# Patient Record
Sex: FEMALE | Race: WHITE | NOT HISPANIC OR LATINO | URBAN - METROPOLITAN AREA
[De-identification: names, ages, dates, MRNs, and addresses within clinical notes are randomized per-mention and may not be internally consistent; named-entity substitution may affect disease eponyms.]

---

## 2017-02-23 ENCOUNTER — IMPORTED ENCOUNTER (OUTPATIENT)
Dept: URBAN - METROPOLITAN AREA CLINIC 1 | Facility: CLINIC | Age: 72
End: 2017-02-23

## 2017-08-30 ENCOUNTER — HOSPITAL ENCOUNTER (OUTPATIENT)
Dept: MAMMOGRAPHY | Age: 72
Discharge: HOME | End: 2017-08-30
Attending: INTERNAL MEDICINE
Payer: MEDICARE

## 2017-08-30 DIAGNOSIS — Z12.31 VISIT FOR SCREENING MAMMOGRAM: ICD-10-CM

## 2017-08-30 PROCEDURE — 77063 BREAST TOMOSYNTHESIS BI: CPT

## 2017-12-08 ENCOUNTER — HOSPITAL ENCOUNTER (OUTPATIENT)
Dept: GENERAL RADIOLOGY | Age: 72
Discharge: HOME | End: 2017-12-08
Attending: INTERNAL MEDICINE
Payer: MEDICARE

## 2017-12-08 DIAGNOSIS — M81.0 OSTEOPOROSIS: ICD-10-CM

## 2017-12-08 PROCEDURE — 77080 DXA BONE DENSITY AXIAL: CPT

## 2018-03-20 ENCOUNTER — IMPORTED ENCOUNTER (OUTPATIENT)
Dept: URBAN - METROPOLITAN AREA CLINIC 1 | Facility: CLINIC | Age: 73
End: 2018-03-20

## 2018-03-20 PROBLEM — H04.123: Noted: 2018-03-20

## 2018-03-20 PROBLEM — H26.493: Noted: 2018-03-20

## 2018-03-20 PROBLEM — H40.023: Noted: 2018-03-20

## 2018-03-20 PROBLEM — H43.811: Noted: 2018-03-20

## 2018-03-20 PROBLEM — Z96.1: Noted: 2018-03-20

## 2018-03-20 PROCEDURE — 92133 CPTRZD OPH DX IMG PST SGM ON: CPT

## 2018-03-20 PROCEDURE — 92014 COMPRE OPH EXAM EST PT 1/>: CPT

## 2018-03-20 NOTE — PATIENT DISCUSSION
1.  Glaucoma Suspect OU : (.6 OD/ .7 OS)(FHX/ son)(steroid responder) OCT with slight progression OS and stable OD. Will continue to observe and treat with any future progression. Patient is considered high risk. Condition was discussed with patient and patient understands. Will continue to monitor patient for any progression in condition. Patient was advised to call us with any problems questions or concerns. 2.  Dry Eyes OU -The continuation of artificial tears were recommended. 3.  PVD w/o Tear OD - RD precautions. 4.  Pseudophakia OU - Lensx OU5. PCO OU: (Posterior Capsule Opacification)   Observe and consider yag cap when pt feels pco visually significant and visual acuity decreases to appropriate level. 6. Return for an appointment for 6mo/10 HVF with Dr. Adry Malik.

## 2018-09-24 ENCOUNTER — IMPORTED ENCOUNTER (OUTPATIENT)
Dept: URBAN - METROPOLITAN AREA CLINIC 1 | Facility: CLINIC | Age: 73
End: 2018-09-24

## 2018-09-24 PROBLEM — Z96.1: Noted: 2018-09-24

## 2018-09-24 PROBLEM — H04.123: Noted: 2018-09-24

## 2018-09-24 PROBLEM — H26.493: Noted: 2018-09-24

## 2018-09-24 PROBLEM — H40.023: Noted: 2018-09-24

## 2018-09-24 PROBLEM — H43.811: Noted: 2018-09-24

## 2018-09-24 PROCEDURE — 92083 EXTENDED VISUAL FIELD XM: CPT

## 2018-09-24 PROCEDURE — 92015 DETERMINE REFRACTIVE STATE: CPT

## 2018-09-24 PROCEDURE — 92012 INTRM OPH EXAM EST PATIENT: CPT

## 2018-09-24 NOTE — PATIENT DISCUSSION
1.  Glaucoma Suspect OU (CD 0.60/0.70): (FHX/ son)(steroid responder). HVF WNL OU. Patient is considered high risk. Condition was discussed with patient and patient understands. Will continue to monitor patient for any progression in condition. Patient was advised to call us with any problems questions or concerns. 2.  Dry Eyes OU - Recommend cont ATs QID OU routinely 3. PCO OU-Visually Significant secondary to glare and yag cap recommended. Risks and benefits discussed with pt and pt desires to schedule yag cap OD then OS. 4. Pseudophakia OU - Lensx OU5. PVD w/o Tear OD - RD precautions. Return for an appointment in YAG Cap OD then OS with Dr. Eugenio Whiting.

## 2018-09-24 NOTE — PATIENT DISCUSSION
PCO OU-Visually Significant and yag cap recommended. Risks and benefits discussed with pt and pt desires to schedule yag cap.

## 2018-10-19 ENCOUNTER — IMPORTED ENCOUNTER (OUTPATIENT)
Dept: URBAN - METROPOLITAN AREA CLINIC 1 | Facility: CLINIC | Age: 73
End: 2018-10-19

## 2018-10-19 PROBLEM — H26.491: Noted: 2018-10-19

## 2018-10-19 PROCEDURE — 66821 AFTER CATARACT LASER SURGERY: CPT

## 2018-10-19 NOTE — PATIENT DISCUSSION
YAG CAP OD: (Consent signed and scanned into attachments) 1 gtt Prolensa applied. The purpose and nature of the procedure possible alternative methods of treatment the risks involved and the possibility of complications were discussed with patient. The Patient wishes to proceed and the consent was signed. The laser was then performed under topical anesthesia with no complications. Post op instructions were given to patient as well as a follow-up appointment. Patient was advised to call our office if any questions or concerns. Return as scheduled for 2nd eye Yag CAp with Dr. Maryana Arreaga.

## 2018-10-25 ENCOUNTER — OFFICE VISIT (OUTPATIENT)
Dept: FAMILY MEDICINE CLINIC | Age: 73
End: 2018-10-25

## 2018-10-25 VITALS
TEMPERATURE: 96.7 F | OXYGEN SATURATION: 98 % | HEART RATE: 73 BPM | BODY MASS INDEX: 26.7 KG/M2 | HEIGHT: 55 IN | RESPIRATION RATE: 17 BRPM | SYSTOLIC BLOOD PRESSURE: 114 MMHG | DIASTOLIC BLOOD PRESSURE: 60 MMHG | WEIGHT: 115.4 LBS

## 2018-10-25 DIAGNOSIS — Z12.39 SCREENING FOR MALIGNANT NEOPLASM OF BREAST: ICD-10-CM

## 2018-10-25 DIAGNOSIS — Z80.3 FAMILY HISTORY OF BREAST CANCER: ICD-10-CM

## 2018-10-25 DIAGNOSIS — M85.80 OSTEOPENIA, UNSPECIFIED LOCATION: Primary | ICD-10-CM

## 2018-10-25 RX ORDER — CALCITONIN SALMON 200 [IU]/.09ML
1 SPRAY, METERED NASAL DAILY
Qty: 1 BOTTLE | Refills: 0 | Status: SHIPPED | OUTPATIENT
Start: 2018-10-25 | End: 2019-03-21 | Stop reason: ALTCHOICE

## 2018-10-25 RX ORDER — MULTIVITAMIN
1 TABLET ORAL 2 TIMES DAILY
Qty: 180 TAB | Refills: 4 | Status: SHIPPED | OUTPATIENT
Start: 2018-10-25

## 2018-10-25 NOTE — PROGRESS NOTES
Ewa Riley is a 71 y.o.  female and presents with Chief Complaint Patient presents with  
 Osteopenia Subjective: 
Mrs. Derrick North presents to establish care. She has h/o osteopenia; she has family history of breast cancer in her mother at the age of 61. She denies breast lump or skin changes. There is no problem list on file for this patient. There are no active problems to display for this patient. No Known Allergies Past Medical History:  
Diagnosis Date  Menopause History reviewed. No pertinent surgical history. Family History Problem Relation Age of Onset  Breast Cancer Mother Social History Tobacco Use  Smoking status: Never Smoker  Smokeless tobacco: Never Used Substance Use Topics  Alcohol use: Yes ROS General ROS: negative for - chills, fatigue or fever Psychological ROS: negative for - anxiety or depression Ophthalmic ROS: negative for - blurry vision Endocrine ROS: negative for - polydipsia/polyuria or temperature intolerance Respiratory ROS: no cough, shortness of breath, or wheezing Cardiovascular ROS: no chest pain or dyspnea on exertion Gastrointestinal ROS: no abdominal pain, change in bowel habits, or black or bloody stools Genito-Urinary ROS: no dysuria, trouble voiding, or hematuria Musculoskeletal ROS: negative for - joint pain or muscle pain Neurological ROS: no TIA or stroke symptoms Dermatological ROS: negative for - rash or skin lesion changes All other systems reviewed and are negative. Objective: 
Vitals:  
 10/25/18 1358 BP: 114/60 Pulse: 73 Resp: 17 Temp: 96.7 °F (35.9 °C) TempSrc: Oral  
SpO2: 98% Weight: 115 lb 6.4 oz (52.3 kg) Height: 4' 4.5\" (1.334 m) PainSc:   0 - No pain  
 
 
alert, well appearing, and in no distress, oriented to person, place, and time and normal appearing weight Mental status - normal mood, behavior, speech, dress, motor activity, and thought processes Chest - clear to auscultation, no wheezes, rales or rhonchi, symmetric air entry Heart - normal rate, regular rhythm, normal S1, S2, no murmurs, rubs, clicks or gallops Neurological - wide based gait Assessment/Plan: 1. Osteopenia, unspecified location Start intranasal calcitonin after discussion of treatment options 
- calcitonin, salmon, (MIACALCIN) nasal; 1 [place] by IntraNASal route daily. Dispense: 1 Bottle; Refill: 0 
 
2. Family history of breast cancer Imaging ordered - [facility] MAMMO BI SCREENING INCL CAD; Future 3. Screening for malignant neoplasm of breast 
 
- [facility] MAMMO BI SCREENING INCL CAD; Future Lab review: no lab studies available for review at time of visit I have discussed the diagnosis with the patient and the intended plan as seen in the above orders. The patient has received an after-visit summary and questions were answered concerning future plans. I have discussed medication side effects and warnings with the patient as well. I have reviewed the plan of care with the patient, accepted their input and they are in agreement with the treatment goals. Follow-up Disposition: 
Return in about 1 month (around 11/25/2018) for medication evaluation.

## 2018-10-25 NOTE — PATIENT INSTRUCTIONS
Learning About Osteopenia What is osteopenia? Osteopenia is a decrease in thickness, or density, in bones. That means the bones become thinner and weaker. It is much more common in women than in men. It is an early form of osteoporosis, a condition in which the bones are so thin and weak that they can break easily. It's important to know that osteopenia is not a disease. It can happen normally with aging. Having osteopenia means that there is a greater risk that you may get osteoporosis. It also means that you are more likely to break a bone than someone who does not have osteopenia. But not everyone with osteopenia gets osteoporosis or breaks a bone. Osteopenia doesn't cause any symptoms. It's usually found with a type of X-ray called a bone density test. Osteopenia means that your bone density result (T-score) is between 1.0 and 2.5. What increases the risk for osteopenia? Things that increase your risk include: 
· Having a family history of osteoporosis. · Being thin. · Being white or . · Getting too little physical activity. · Smoking. · Drinking too much alcohol often. · Using certain medicines such as steroids. How can you prevent osteoporosis? There are things you can do to slow down osteopenia and prevent osteoporosis. Certain lifestyle changes will help slow the loss of bone density. · Eat food that has plenty of calcium and vitamin D. Yogurt, cheese, milk, and dark green vegetables are high in calcium. Eggs, fatty fish, cereal, and fortified milk are high in vitamin D. 
· Talk to your doctor about taking a calcium supplement that has vitamin D in it. · Get regular exercise. ? Do 30 minutes of weight-bearing exercise on most days of the week. Walking, jogging, stair climbing, and dancing are good choices. ? Do resistance exercises with weights or elastic bands 2 or 3 days a week. · Limit alcohol to 2 drinks a day for men and 1 drink a day for women.  Too much alcohol can cause health problems. · Do not smoke. Smoking can make bones thin faster. If you need help quitting, talk to your doctor about stop-smoking programs and medicines. These can increase your chances of quitting for good. Prescription medicines are available for treating bone thinning. But these are more often used to treat osteoporosis. Follow-up care is a key part of your treatment and safety. Be sure to make and go to all appointments, and call your doctor if you are having problems. It's also a good idea to know your test results and keep a list of the medicines you take. Where can you learn more? Go to http://joseph-tye.info/. Enter P671 in the search box to learn more about \"Learning About Osteopenia. \" Current as of: March 16, 2018 Content Version: 11.8 © 7610-7118 [employer]. Care instructions adapted under license by [employer] (which disclaims liability or warranty for this information). If you have questions about a medical condition or this instruction, always ask your healthcare professional. [place] [address] any warranty or liability for your use of this information.

## 2018-10-25 NOTE — PROGRESS NOTES
Bright Beck is a 71 y.o female that is present in the office for a new patient appointment. Patient refused flu vaccine until later in year. Patient received pneumococcal 23 and Prevnar 13.  
 
1. Have you been to the ER, urgent care clinic since your last visit? Hospitalized since your last visit? No 
 
2. Have you seen or consulted any other health care providers outside of the [address] [place] [address] since your last visit? Include any pap smears or colon screening. No  
 
Health Maintenance Due Topic Date Due  
 Hepatitis C Screening  1945  
 DTaP/Tdap/Td series (1 - Tdap) 12/11/1967  Shingrix Vaccine Age 50> (1 of 2) 12/11/1996  
 FOBT Q 1 YEAR AGE 50-75  12/11/1996  GLAUCOMA SCREENING Q2Y  12/11/2011  Pneumococcal 65+ Low/Medium Risk (1 of 2 - PCV13) 12/11/2011  MEDICARE YEARLY EXAM  03/15/2018  Influenza Age 5 to Adult  08/01/2018

## 2018-10-26 ENCOUNTER — HOSPITAL ENCOUNTER (OUTPATIENT)
Dept: MAMMOGRAPHY | Age: 73
Discharge: HOME | End: 2018-10-26
Attending: FAMILY MEDICINE
Payer: MEDICARE

## 2018-10-26 DIAGNOSIS — Z12.31 VISIT FOR SCREENING MAMMOGRAM: ICD-10-CM

## 2018-10-26 PROCEDURE — 77063 BREAST TOMOSYNTHESIS BI: CPT

## 2018-11-02 ENCOUNTER — IMPORTED ENCOUNTER (OUTPATIENT)
Dept: URBAN - METROPOLITAN AREA CLINIC 1 | Facility: CLINIC | Age: 73
End: 2018-11-02

## 2018-11-02 PROBLEM — H26.492: Noted: 2018-11-02

## 2018-11-02 PROCEDURE — 66821 AFTER CATARACT LASER SURGERY: CPT

## 2018-11-02 NOTE — PATIENT DISCUSSION
YAG CAP OS: (Consent signed and scanned into attachments) 1 gtt Prolensa applied. The purpose and nature of the procedure possible alternative methods of treatment the risks involved and the possibility of complications were discussed with patient. The Patient wishes to proceed and the consent was signed. The laser was then performed under topical anesthesia with no complications. Post op instructions were given to patient as well as a follow-up appointment. Patient was advised to call our office if any questions or concerns.  RTC: 1-6 week YAG PO.

## 2018-12-04 ENCOUNTER — IMPORTED ENCOUNTER (OUTPATIENT)
Dept: URBAN - METROPOLITAN AREA CLINIC 1 | Facility: CLINIC | Age: 73
End: 2018-12-04

## 2018-12-04 PROCEDURE — 99024 POSTOP FOLLOW-UP VISIT: CPT

## 2018-12-04 NOTE — PATIENT DISCUSSION
PO YAG Cap OU: good result. MRX given. Return for an appointment for 30/OCT in 6 months with Dr. Caitlin Gan.

## 2019-03-21 ENCOUNTER — OFFICE VISIT (OUTPATIENT)
Dept: FAMILY MEDICINE CLINIC | Age: 74
End: 2019-03-21

## 2019-03-21 VITALS
WEIGHT: 117.2 LBS | HEIGHT: 55 IN | RESPIRATION RATE: 16 BRPM | BODY MASS INDEX: 27.12 KG/M2 | HEART RATE: 75 BPM | OXYGEN SATURATION: 98 % | SYSTOLIC BLOOD PRESSURE: 122 MMHG | DIASTOLIC BLOOD PRESSURE: 54 MMHG | TEMPERATURE: 97.5 F

## 2019-03-21 DIAGNOSIS — Z80.0 FAMILY HISTORY OF COLON CANCER IN FATHER: ICD-10-CM

## 2019-03-21 DIAGNOSIS — Z23 NEED FOR SHINGLES VACCINE: ICD-10-CM

## 2019-03-21 DIAGNOSIS — Z80.3 FAMILY HISTORY OF BREAST CANCER: ICD-10-CM

## 2019-03-21 DIAGNOSIS — Z00.00 MEDICARE ANNUAL WELLNESS VISIT, INITIAL: Primary | ICD-10-CM

## 2019-03-21 DIAGNOSIS — Z71.89 ADVANCE CARE PLANNING: ICD-10-CM

## 2019-03-21 DIAGNOSIS — M81.0 AGE-RELATED OSTEOPOROSIS WITHOUT CURRENT PATHOLOGICAL FRACTURE: ICD-10-CM

## 2019-03-21 RX ORDER — ALENDRONATE SODIUM 70 MG/1
70 TABLET ORAL
Qty: 12 TAB | Refills: 3 | Status: SHIPPED | OUTPATIENT
Start: 2019-03-21 | End: 2020-01-07 | Stop reason: SDUPTHER

## 2019-03-21 NOTE — PROGRESS NOTES
(AWV) The Initial Medicare Annual Wellness Exam PROGRESS NOTE This is an Initial Medicare Annual Wellness Exam (AWV) (Performed 12 months after IPPE or effective date of Medicare Part B enrollment, Once in a lifetime) I have reviewed the patient's medical history in detail and updated the computerized patient record. Kapil Nguyễn is a 68 y.o.  female and presents for an annual wellness exam  
 
ROS  
- fevers, fatigue, weight loss 
- depression. + anxiety 
- headaches, blurred vision 
- sore throat, sinus congestion - chest pain, palpitations 
- shortness of breath, cough, wheezing 
- nausea, vomiting, abdominal pain, diarrhea 
- dysuria, frequency, hematuria 
- leg swelling + joint swelling (OA) 
+ rash (eczema) All other systems reviewed and are negative. History Past Medical History:  
Diagnosis Date  Menopause No past surgical history on file. Current Outpatient Medications Medication Sig Dispense Refill  calcitonin, salmon, (MIACALCIN) nasal 1 [place] by IntraNASal route daily. 1 Bottle 0  
 calcium-cholecalciferol, D3, (CALTRATE 600+D) tablet Take 1 Tab by mouth two (2) times a day. 180 Tab 4 No Known Allergies Family History Problem Relation Age of Onset  Breast Cancer Mother 48  Cancer Father   
     colon Social History Tobacco Use  Smoking status: Never Smoker  Smokeless tobacco: Never Used Substance Use Topics  Alcohol use: Yes There is no problem list on file for this patient. Health Maintenance History Immunizations reviewed, dtap due , pneumovax due, flu up to date, zoster due Colonoscopy: due Eye exam: up to date Mammo up to date Dexascan up to date Depression Risk Factor Screening:  
  
Patient Health Questionnaire (PHQ-2) Over the last 2 weeks, how often have you been bothered by any of the following problems? · Little interest or pleasure in doing things? · Not at all. [0] · Feeling down, depressed, or hopeless? · Not at all. [0] Total Score: 0/6 PHQ-2 Assessment Scoring: A score of 2 or more requires further screening with the PHQ-9 Alcohol Risk Factor Screening:  
 
Women: On any occasion during the past 3 months, have you had more than 3 drinks containing alcohol?  no 
 Do you average more than 7 drinks per week?  no 
 
Functional Ability and Level of Safety:  
 
Hearing Loss Hearing is good. Activities of Daily Living Self-care. Requires assistance with: no ADLs Fall Risk No fall risk factors Abuse Screen Patient is not abused Examination Physical Examination Vitals:  
 03/21/19 0845 BP: 122/54 Pulse: 75 Resp: 16 Temp: 97.5 °F (36.4 °C) TempSrc: Oral  
SpO2: 98% Weight: 117 lb 3.2 oz (53.2 kg) Height: 4' 4.5\" (1.334 m) PainSc:   0 - No pain Body mass index is 29.9 kg/m². Evaluation of Cognitive Function: 
Mood/affect:good mood with appropriate affect Appearance:well kempt Family member/caregiver input: n/a 
 
alert, well appearing, and in no distress, oriented to person, place, and time and overweight Patient Care Team: 
Jayden Galindo MD as PCP - [facility]) End-of-life planning Advanced Directive in the case than an injury or illness causes the patient to be unable to make health care decisions Health Care Directive or Living Will: no 
 
Advice/Referrals/Counselling/Plan:  
Education and counseling provided: 
Are appropriate based on today's review and evaluation End-of-Life planning (with patient's consent) Pneumococcal Vaccine Screening Mammography Cardiovascular screening blood test 
Diabetes screening test 
Include in education list (weight loss, physical activity, smoking cessation, fall prevention, and nutrition) ICD-10-CM ICD-9-CM 1. Medicare annual wellness visit, initial Z00.00 V70.0 [id number] 2. Advance care planning Z71.89 V65.49 3. Age-related osteoporosis without current pathological fracture M81.0 733.01 alendronate (FOSAMAX) 70 mg tablet 4. Family history of breast cancer Z80.3 V16.3 5. Family history of colon cancer in father Z80.0 V16.0 6. Need for shingles vaccine Z23 V04.89 Huong Riser Brief written plan, checklist 
 
I have discussed the diagnosis with the patient and the intended plan as seen in the above orders. The patient has received an after-visit summary and questions were answered concerning future plans. I have discussed medication side effects and warnings with the patient as well. I have reviewed the plan of care with the patient, accepted their input and they are in agreement with the treatment goals. ____________________________________________________________ Problem Assessment 
 
for treatment of  
Chief Complaint Patient presents with  Medication Refill  
  printed rx for calcitonin [facility] Annual Wellness Visit SUBJECTIVE 
Ms Efren Ryan presents for follow up on medications Calcitonin and Caltrate for bone density. She has been doing well with the Calcitonin but has only been taking Caltrate once daily due to feeling nauseated and experiencing excess gas when she takes it. She has taken alendronate but was noncompliant due to feeling it was inconvenient having to stay upright for an hour after taking. She did not have any side effects and is willing to try them again. 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 Followed by dermatology for eczema Followed by gastroenterology for family h/o colon cancer Visit Vitals /54 (BP 1 Location: Left arm, BP Patient Position: Sitting) Pulse 75 Temp 97.5 °F (36.4 °C) (Oral) Resp 16 Ht 4' 4.5\" (1.334 m) Wt 117 lb 3.2 oz (53.2 kg) SpO2 98% BMI 29.90 kg/m² General:  Alert, cooperative, no distress, appears stated age. Head:  Normocephalic, without obvious abnormality, atraumatic. Eyes:  Conjunctivae/corneas clear. PERRL, EOMs intact. Ears:  Normal TMs and external ear canals both ears. Nose: Nares normal. Septum midline. Mucosa normal. No drainage or sinus tenderness. Throat: Lips, mucosa, and tongue normal. Teeth and gums normal.  
Neck: Supple, symmetrical, trachea midline, no adenopathy, thyroid: no enlargement/tenderness/nodules. Back:   Symmetric, no curvature. ROM normal.  
Lungs:   Clear to auscultation bilaterally. Chest wall:  No tenderness or deformity. Heart:  Regular rate and rhythm, S1, S2 normal, no murmur, click, rub or gallop. Breast Exam:  No tenderness, masses, or nipple abnormality. Abdomen:   Soft, non-tender. Bowel sounds normal. No masses,  No organomegaly. Genitalia:  deferred Rectal:  deferred Extremities: Extremities normal, atraumatic, no cyanosis or edema. Pulses: 2+ and symmetric all extremities. Skin: Skin color, texture, turgor normal. No rashes or lesions. Lymph nodes: Cervical, supraclavicular, and axillary nodes normal.  
Neurologic: CNII-XII intact. Normal strength, sensation and reflexes throughout. Assessment/Plan: 1. Advance care planning See ACP 2. Medicare annual wellness visit, initial 
Reviewed preventive recommendations - [id number] [place] [address] [facility] [employer] 3. Age-related osteoporosis without current pathological fracture Start bisphosphonate 
- alendronate (FOSAMAX) 70 mg tablet; Take 1 Tab by mouth every seven (7) days. Dispense: 12 Tab; Refill: 3 
 
4. Family history of breast cancer Needs imaginge 5. Family history of colon cancer in father F/u with GI 
 
6. Need for shingles vaccine 
declined Lab review: no lab studies available for review at time of visit

## 2019-03-21 NOTE — PATIENT INSTRUCTIONS
Well Visit, Over 72: Care Instructions Your Care Instructions Physical exams can help you stay healthy. Your doctor has checked your overall health and may have suggested ways to take good care of yourself. He or she also may have recommended tests. At home, you can help prevent illness with healthy eating, regular exercise, and other steps. Follow-up care is a key part of your treatment and safety. Be sure to make and go to all appointments, and call your doctor if you are having problems. It's also a good idea to know your test results and keep a list of the medicines you take. How can you care for yourself at home? · Reach and stay at a healthy weight. This will lower your risk for many problems, such as obesity, diabetes, heart disease, and high blood pressure. · Get at least 30 minutes of exercise on most days of the week. Walking is a good choice. You also may want to do other activities, such as running, swimming, cycling, or playing tennis or team sports. · Do not smoke. Smoking can make health problems worse. If you need help quitting, talk to your doctor about stop-smoking programs and medicines. These can increase your chances of quitting for good. · Protect your skin from too much sun. When you're outdoors from 10 a.m. to 4 p.m., stay in the shade or cover up with clothing and a hat with a wide brim. Wear sunglasses that block UV rays. Even when it's cloudy, put broad-spectrum sunscreen (SPF 30 or higher) on any exposed skin. · See a dentist one or two times a year for checkups and to have your teeth cleaned. · Wear a seat belt in the car. · Limit alcohol to 2 drinks a day for men and 1 drink a day for women. Too much alcohol can cause health problems. Follow your doctor's advice about when to have certain tests. These tests can spot problems early. For men and women · Cholesterol.  Your doctor will tell you how often to have this done based on your overall health and other things that can increase your risk for heart attack and stroke. · Blood pressure. Have your blood pressure checked during a routine doctor visit. Your doctor will tell you how often to check your blood pressure based on your age, your blood pressure results, and other factors. · Diabetes. Ask your doctor whether you should have tests for diabetes. · Vision. Experts recommend that you have yearly exams for glaucoma and other age-related eye problems. · Hearing. Tell your doctor if you notice any change in your hearing. You can have tests to find out how well you hear. · Colon cancer tests. Keep having colon cancer tests as your doctor recommends. You can have one of several types of tests. · Heart attack and stroke risk. At least every 4 to 6 years, you should have your risk for heart attack and stroke assessed. Your doctor uses factors such as your age, blood pressure, cholesterol, and whether you smoke or have diabetes to show what your risk for a heart attack or stroke is over the next 10 years. · Osteoporosis. Talk to your doctor about whether you should have a bone density test to find out whether you have thinning bones. Also ask your doctor about whether you should take calcium and vitamin D supplements. For women · Pap test and pelvic exam. You may no longer need a Pap test. Talk with your doctor about whether to stop or continue to have Pap tests. · Breast exam and mammogram. Ask how often you should have a mammogram, which is an X-ray of your breasts. A mammogram can spot breast cancer before it can be felt and when it is easiest to treat. · Thyroid disease. Talk to your doctor about whether to have your thyroid checked as part of a regular physical exam. Women have an increased chance of a thyroid problem. For men · Prostate exam. Talk to your doctor about whether you should have a blood test (called a PSA test) for prostate cancer.  Experts disagree on whether men should have this test. Some experts recommend that you discuss the benefits and risks of the test with your doctor. · Abdominal aortic aneurysm. Ask your doctor whether you should have a test to check for an aneurysm. You may need a test if you ever smoked or if your parent, brother, sister, or child has had an aneurysm. When should you call for help? Watch closely for changes in your health, and be sure to contact your doctor if you have any problems or symptoms that concern you. Where can you learn more? Go to http://joseph-tye.info/. Enter J534 in the search box to learn more about \"Well Visit, Over 65: Care Instructions. \" Current as of: March 28, 2018 Content Version: 11.9 © 7669-2123 [employer]. Care instructions adapted under license by [employer] (which disclaims liability or warranty for this information). If you have questions about a medical condition or this instruction, always ask your healthcare professional. [place] [address] any warranty or liability for your use of this information. Deciding About Bisphosphonate Medicine for Osteoporosis How can you decide about taking bisphosphonate medicine for osteoporosis? What is osteoporosis? Osteoporosis is a disease that affects your bones. It means you have bones that are thin and brittle and have lots of holes inside them like a sponge. This makes them easy to break. It also increases your risk for spine and hip fractures. These fractures may make it hard for you to live on your own. Bisphosphonates are the most common medicines used to prevent bone loss. Most of the time, you take them as pills. They slow the way bone dissolves and is absorbed by your body. They can increase bone thickness and strength. What are key points about this decision? · If you have osteoporosis, these medicines can increase bone thickness. And they can lower your risk of spine and hip fractures. You may also want to think about taking them if you have low bone density (sometimes called osteopenia) or risk factors for osteoporosis. · These medicines can have side effects, such as heartburn and belly pain. They also can cause headaches. Their long-term effects are not known. · Whether you take medicine or not, healthy habits can also help protect your bones. Talk to your doctor about taking calcium and vitamin D supplements. Get regular weight-bearing exercise. Cut back on alcohol. And if you smoke, quit. Why might you choose to take bisphosphonate medicines? · You have bone loss that is not normal for your age. · You have osteoporosis or low bone density. Or you have risk factors for osteoporosis. · You have been taking hormone therapy (HT) and stopped. Bone loss medicines can protect against rapid bone loss after you quit HT. · You do not mind taking pills. Or you do not mind getting medicines through a tube in your vein, called an IV. · You think the benefits of taking these medicines outweigh the side effects. Why might you choose not to take these medicines? · You want to try other medicines that help prevent bone loss. These include calcitonin (Calcimar or Miacalcin), denosumab (Prolia or Xgeva), hormone therapy, raloxifene (Evista), and teriparatide (Forteo). · You want to try healthy habits to prevent bone fractures. · You do not like the idea of taking pills. Or you do not like getting medicines through a tube in your vein, called an IV. · You are worried about the side effects of these medicines. Your decision Thinking about the facts and your feelings can help you make a decision that is right for you. Be sure you understand the benefits and risks of your options, and think about what else you need to do before you make the decision. Where can you learn more? Go to http://joseph-tye.info/. Enter D757 in the search box to learn more about \"Deciding About Bisphosphonate Medicine for Osteoporosis. \" Current as of: March 15, 2018 Content Version: 11.9 © 8085-0556 [employer]. Care instructions adapted under license by [employer] (which disclaims liability or warranty for this information). If you have questions about a medical condition or this instruction, always ask your healthcare professional. [place] [address] any warranty or liability for your use of this information.

## 2019-03-21 NOTE — PROGRESS NOTES
Chief Complaint Patient presents with  Medication Refill  
  printed rx for calcitonin [facility] Annual Wellness Visit 1. Have you been to the ER, urgent care clinic since your last visit? Hospitalized since your last visit? No 
 
2. Have you seen or consulted any other health care providers outside of the [address] [place] [address] since your last visit? Include any pap smears or colon screening.  No

## 2019-06-11 ENCOUNTER — IMPORTED ENCOUNTER (OUTPATIENT)
Dept: URBAN - METROPOLITAN AREA CLINIC 1 | Facility: CLINIC | Age: 74
End: 2019-06-11

## 2019-06-11 PROBLEM — Z96.1: Noted: 2019-06-11

## 2019-06-11 PROBLEM — H43.811: Noted: 2019-06-11

## 2019-06-11 PROBLEM — H04.123: Noted: 2019-06-11

## 2019-06-11 PROBLEM — H35.361: Noted: 2019-06-11

## 2019-06-11 PROBLEM — H40.1131: Noted: 2019-06-11

## 2019-06-11 PROCEDURE — 92014 COMPRE OPH EXAM EST PT 1/>: CPT

## 2019-06-11 PROCEDURE — 92133 CPTRZD OPH DX IMG PST SGM ON: CPT

## 2019-06-11 NOTE — PATIENT DISCUSSION
1.  Mild Open Angle Glaucoma OU (CD: 0.60 / 0.65) -- IOP elevated today OU. Begin monotrial of Travatan-Z QHS OU (Sample Given). OCT today shows moderate thinning OD and minimal thinning OS. steroid responder. Positive Family h/o Glaucoma. Patient to continue with current gtt regimen. Patient advised to be compliant with gtts. Condition was discussed with patient and patient understands. Will continue to monitor patient for any progression in condition. Patient was advised to call us with any problems questions or concerns. 2.  Dry Eyes OU -- Recommend continue the frequent use of OTC AT's TID-QID OU Routinely. 3.  PVD w/o Tear OD -- Old Stable. RD precautions given. 4. Macular Drusen OD -- Stable. 5.  Pseudophakia OU w/ LenSx OU & h/o YAG Cap OU -- Doing well. Patient defers the refraction at today's visit. Return for an appointment in 1 MO for a 10 (IOP Check) with Dr. Inna Swanson.

## 2019-06-24 ENCOUNTER — OFFICE VISIT (OUTPATIENT)
Dept: FAMILY MEDICINE CLINIC | Age: 74
End: 2019-06-24

## 2019-06-24 VITALS
DIASTOLIC BLOOD PRESSURE: 58 MMHG | HEIGHT: 55 IN | RESPIRATION RATE: 16 BRPM | TEMPERATURE: 97 F | SYSTOLIC BLOOD PRESSURE: 121 MMHG | OXYGEN SATURATION: 99 % | WEIGHT: 117.4 LBS | HEART RATE: 74 BPM | BODY MASS INDEX: 27.17 KG/M2

## 2019-06-24 DIAGNOSIS — H40.9 GLAUCOMA OF RIGHT EYE, UNSPECIFIED GLAUCOMA TYPE: ICD-10-CM

## 2019-06-24 DIAGNOSIS — M81.0 AGE-RELATED OSTEOPOROSIS WITHOUT CURRENT PATHOLOGICAL FRACTURE: Primary | ICD-10-CM

## 2019-06-24 NOTE — PROGRESS NOTES
Debbie Langford is a 68 y.o female that is present in the office for a routine appointment for medication evaluation. 1. Have you been to the ER, urgent care clinic since your last visit? Hospitalized since your last visit? No    2. Have you seen or consulted any other health care providers outside of the [address] [place] [address] since your last visit? Include any pap smears or colon screening.  No     Health Maintenance Due   Topic Date Due    Hepatitis C Screening  1945    COLONOSCOPY  12/11/1964    DTaP/Tdap/Td series (1 - Tdap) 12/11/1967    Shingrix Vaccine Age 50> (1 of 2) 12/11/1996    GLAUCOMA SCREENING Q2Y  12/11/2011

## 2019-06-24 NOTE — PROGRESS NOTES
Alisha Dan is a 68 y.o.  female and presents with    Chief Complaint   Patient presents with    Medication Evaluation     discuss medication alternatives       Subjective:  Mrs. Felicita Mg returns to discuss medical management of osteoporosis; she is exercising regularly and taking fosamax without side effects; she wants to know if there is something better. She has questions about injectable medication. She has a high family history of cancer. ROS   - fevers, fatigue, weight loss  - depression. + anxiety  - headaches, blurred vision  - sore throat, sinus congestion  - chest pain, palpitations  - shortness of breath, cough, wheezing  - nausea, vomiting, abdominal pain, diarrhea  - dysuria, frequency, hematuria  - leg swelling + joint swelling (OA)  + rash (eczema)     All other systems reviewed and are negative. Objective:  Vitals:    06/24/19 0824   BP: 121/58   Pulse: 74   Resp: 16   Temp: 97 °F (36.1 °C)   TempSrc: Oral   SpO2: 99%   Weight: 117 lb 6.4 oz (53.3 kg)   Height: 4' 4.5\" (1.334 m)   PainSc:   0 - No pain     alert, well appearing, and in no distress, oriented to person, place, and time and overweight  Mental status - normal mood, behavior, speech, dress, motor activity, and thought processes  Neurological - cranial nerves II through XII intact, normal gait and station  Extremities - peripheral pulses normal, no pedal edema, no clubbing or cyanosis  Skin - normal coloration and turgor, no rashes, no suspicious skin lesions noted    Assessment/Plan:    1. Age-related osteoporosis without current pathological fracture  Repeat test in 6 months; discussed therapies and their risks and benefits; at this time will continue fosamax  - DEXA BONE DENSITY STUDY AXIAL; Future    2.  Glaucoma of right eye, unspecified glaucoma type  F/u with ophthalmology    Lab review: no lab studies available for review at time of visit      I have discussed the diagnosis with the patient and the intended plan as seen in the above orders. The patient has received an after-visit summary and questions were answered concerning future plans. I have discussed medication side effects and warnings with the patient as well. I have reviewed the plan of care with the patient, accepted their input and they are in agreement with the treatment goals.

## 2019-07-12 ENCOUNTER — IMPORTED ENCOUNTER (OUTPATIENT)
Dept: URBAN - METROPOLITAN AREA CLINIC 1 | Facility: CLINIC | Age: 74
End: 2019-07-12

## 2019-07-12 PROBLEM — H40.1131: Noted: 2019-07-12

## 2019-07-12 PROCEDURE — 92012 INTRM OPH EXAM EST PATIENT: CPT

## 2019-07-12 NOTE — PATIENT DISCUSSION
1.  Mild COAG OU (CD 0.60 / 0.65) IOP improved OD on monotrial of Travatan Z OD will now use Travatan Z QHs OU (Written rx given to patient per her request). Compliance with gtts. 2.  Dry Eyes OU -- Cont ATs TID OU Routinely. 3.  H/o PVD OD 4. H/o Macular Drusen OD  5. Pseudophakia OU w/ LenSx OU H/o YAG Cap OU  Return for an appointment in 6 mo 10 VF 24-2 OU with Dr. Iban Cope.

## 2019-12-16 ENCOUNTER — HOSPITAL ENCOUNTER (OUTPATIENT)
Dept: GENERAL RADIOLOGY | Age: 74
Discharge: HOME | End: 2019-12-16
Attending: FAMILY MEDICINE
Payer: MEDICARE

## 2019-12-16 ENCOUNTER — HOSPITAL ENCOUNTER (OUTPATIENT)
Dept: MAMMOGRAPHY | Age: 74
Discharge: HOME | End: 2019-12-16
Attending: FAMILY MEDICINE
Payer: MEDICARE

## 2019-12-16 DIAGNOSIS — M81.0 AGE-RELATED OSTEOPOROSIS WITHOUT CURRENT PATHOLOGICAL FRACTURE: ICD-10-CM

## 2019-12-16 DIAGNOSIS — Z12.31 BREAST CANCER SCREENING BY MAMMOGRAM: ICD-10-CM

## 2019-12-16 PROCEDURE — 77063 BREAST TOMOSYNTHESIS BI: CPT

## 2019-12-16 PROCEDURE — 77080 DXA BONE DENSITY AXIAL: CPT

## 2020-01-07 ENCOUNTER — OFFICE VISIT (OUTPATIENT)
Dept: FAMILY MEDICINE CLINIC | Age: 75
End: 2020-01-07

## 2020-01-07 ENCOUNTER — TELEPHONE (OUTPATIENT)
Dept: FAMILY MEDICINE CLINIC | Age: 75
End: 2020-01-07

## 2020-01-07 ENCOUNTER — HOSPITAL ENCOUNTER (OUTPATIENT)
Dept: LAB | Age: 75
Discharge: HOME | End: 2020-01-07
Payer: MEDICARE

## 2020-01-07 VITALS
BODY MASS INDEX: 22.47 KG/M2 | DIASTOLIC BLOOD PRESSURE: 72 MMHG | TEMPERATURE: 97.5 F | HEART RATE: 70 BPM | WEIGHT: 119 LBS | HEIGHT: 61 IN | RESPIRATION RATE: 12 BRPM | OXYGEN SATURATION: 97 % | SYSTOLIC BLOOD PRESSURE: 122 MMHG

## 2020-01-07 DIAGNOSIS — E78.00 HYPERCHOLESTEROLEMIA: ICD-10-CM

## 2020-01-07 DIAGNOSIS — M81.0 AGE-RELATED OSTEOPOROSIS WITHOUT CURRENT PATHOLOGICAL FRACTURE: ICD-10-CM

## 2020-01-07 DIAGNOSIS — Z80.0 FAMILY HISTORY OF COLON CANCER IN FATHER: ICD-10-CM

## 2020-01-07 DIAGNOSIS — H40.9 GLAUCOMA OF RIGHT EYE, UNSPECIFIED GLAUCOMA TYPE: ICD-10-CM

## 2020-01-07 DIAGNOSIS — D75.89 MACROCYTOSIS: ICD-10-CM

## 2020-01-07 DIAGNOSIS — M81.0 AGE-RELATED OSTEOPOROSIS WITHOUT CURRENT PATHOLOGICAL FRACTURE: Primary | ICD-10-CM

## 2020-01-07 LAB
CHOLEST SERPL-MCNC: 200 MG/DL
HDLC SERPL-MCNC: 80 MG/DL (ref 40–60)
HDLC SERPL: 2.5 {RATIO} (ref 0–5)
LDLC SERPL CALC-MCNC: 107.8 MG/DL (ref 0–100)
LIPID PROFILE,FLP: ABNORMAL
TRIGL SERPL-MCNC: 61 MG/DL (ref ?–150)
VLDLC SERPL CALC-MCNC: 12.2 MG/DL

## 2020-01-07 PROCEDURE — 36415 COLL VENOUS BLD VENIPUNCTURE: CPT

## 2020-01-07 PROCEDURE — 80061 LIPID PANEL: CPT

## 2020-01-07 PROCEDURE — 85025 COMPLETE CBC W/AUTO DIFF WBC: CPT

## 2020-01-07 RX ORDER — ALENDRONATE SODIUM 70 MG/1
70 TABLET ORAL
Qty: 12 TAB | Refills: 3 | Status: SHIPPED | OUTPATIENT
Start: 2020-01-07 | End: 2021-01-14

## 2020-01-07 RX ORDER — ALENDRONATE SODIUM 70 MG/1
70 TABLET ORAL
Qty: 12 TAB | Refills: 3 | Status: SHIPPED | OUTPATIENT
Start: 2020-01-07 | End: 2020-01-07 | Stop reason: SDUPTHER

## 2020-01-07 NOTE — PROGRESS NOTES
Daphne Mg is a 69 y.o.  female and presents with    Chief Complaint   Patient presents with    Results     Subjective:  Mrs. [facility] presents to discuss dexa scan results; she also returns to discuss medical management of osteoporosis; she is exercising regularly and taking fosamax without side effects; she wants to know if there is something better. She has questions about injectable medication. She has a high family history of cancer.       ROS   - fevers, fatigue, weight loss  - depression. + anxiety  - headaches, blurred vision  - sore throat, sinus congestion  - chest pain, palpitations  - shortness of breath, cough, wheezing  - nausea, vomiting, abdominal pain, diarrhea  - dysuria, frequency, hematuria  - leg swelling + joint swelling (OA)  + rash (eczema)     All other systems reviewed and are negative. Objective:  Vitals:    01/07/20 1503   BP: 122/72   Pulse: 70   Resp: 12   Temp: 97.5 °F (36.4 °C)   TempSrc: Oral   SpO2: 97%   Weight: 119 lb (54 kg)   Height: 5' 1\" (1.549 m)   PainSc:   0 - No pain     alert, well appearing, and in no distress, oriented to person, place, and time and overweight  Mental status - normal mood, behavior, speech, dress, motor activity, and thought processes  Neurological - cranial nerves II through XII intact, normal gait and station  Extremities - peripheral pulses normal, no pedal edema, no clubbing or cyanosis  Skin - normal coloration and turgor, no rashes, no suspicious skin lesions noted       LABS     TESTS  DEXA scan  IMPRESSION:     1. Bone mineral density shows evidence of osteoporosis. Fracture risk is high. Treatment, if not already started, should be considered. 2.  No statistically significant interval change in spine or bilateral hip bone  mineral densities compared to the prior study. Assessment/Plan:    1. Age-related osteoporosis without current pathological fracture  Continue oral treatment    2.  Family history of colon cancer in father  screening    3. Glaucoma of right eye, unspecified glaucoma type  F/u with ophthalmology    4. Hypercholesterolemia  Assess level  - LIPID PANEL; Future    5. Macrocytosis  Vitamin b12 adequate  - CBC WITH AUTOMATED DIFF; Future    Lab review: orders written for new lab studies as appropriate; see orders      I have discussed the diagnosis with the patient and the intended plan as seen in the above orders. The patient has received an after-visit summary and questions were answered concerning future plans. I have discussed medication side effects and warnings with the patient as well. I have reviewed the plan of care with the patient, accepted their input and they are in agreement with the treatment goals.

## 2020-01-08 LAB
BASOPHILS # BLD: 0 K/UL (ref 0–0.1)
BASOPHILS NFR BLD: 1 % (ref 0–2)
DIFFERENTIAL METHOD BLD: ABNORMAL
EOSINOPHIL # BLD: 0.1 K/UL (ref 0–0.4)
EOSINOPHIL NFR BLD: 2 % (ref 0–5)
ERYTHROCYTE [DISTWIDTH] IN BLOOD BY AUTOMATED COUNT: 12.5 % (ref 11.6–14.5)
HCT VFR BLD AUTO: 43.1 % (ref 35–45)
HGB BLD-MCNC: 14.4 G/DL (ref 12–16)
LYMPHOCYTES # BLD: 2.2 K/UL (ref 0.9–3.6)
LYMPHOCYTES NFR BLD: 36 % (ref 21–52)
MCH RBC QN AUTO: 33.4 PG (ref 24–34)
MCHC RBC AUTO-ENTMCNC: 33.4 G/DL (ref 31–37)
MCV RBC AUTO: 100 FL (ref 74–97)
MONOCYTES # BLD: 0.5 K/UL (ref 0.05–1.2)
MONOCYTES NFR BLD: 8 % (ref 3–10)
NEUTS SEG # BLD: 3.2 K/UL (ref 1.8–8)
NEUTS SEG NFR BLD: 53 % (ref 40–73)
PLATELET # BLD AUTO: 264 K/UL (ref 135–420)
PMV BLD AUTO: 9.6 FL (ref 9.2–11.8)
RBC # BLD AUTO: 4.31 M/UL (ref 4.2–5.3)
WBC # BLD AUTO: 6.1 K/UL (ref 4.6–13.2)

## 2020-02-24 ENCOUNTER — IMPORTED ENCOUNTER (OUTPATIENT)
Dept: URBAN - METROPOLITAN AREA CLINIC 1 | Facility: CLINIC | Age: 75
End: 2020-02-24

## 2020-02-24 PROBLEM — H40.1131: Noted: 2020-02-24

## 2020-02-24 PROCEDURE — 92012 INTRM OPH EXAM EST PATIENT: CPT

## 2020-02-24 PROCEDURE — 92083 EXTENDED VISUAL FIELD XM: CPT

## 2020-02-24 NOTE — PATIENT DISCUSSION
Dry Eyes OU - Cont ATs TID OU Routinely. 3.  Pseudophakia OU w/ LenSx OU H/o YAG Cap OU  4. H/o Macular Drusen OD  5.   H/o PVD OD

## 2020-03-06 ENCOUNTER — OFFICE VISIT (OUTPATIENT)
Dept: FAMILY MEDICINE CLINIC | Age: 75
End: 2020-03-06

## 2020-03-06 VITALS
HEART RATE: 98 BPM | DIASTOLIC BLOOD PRESSURE: 81 MMHG | WEIGHT: 117 LBS | SYSTOLIC BLOOD PRESSURE: 114 MMHG | BODY MASS INDEX: 22.09 KG/M2 | RESPIRATION RATE: 16 BRPM | HEIGHT: 61 IN | TEMPERATURE: 98.3 F | OXYGEN SATURATION: 98 %

## 2020-03-06 DIAGNOSIS — Z00.00 MEDICARE ANNUAL WELLNESS VISIT, SUBSEQUENT: Primary | ICD-10-CM

## 2020-03-06 DIAGNOSIS — E78.00 HYPERCHOLESTEROLEMIA: ICD-10-CM

## 2020-03-06 DIAGNOSIS — I44.4 LEFT ANTERIOR FASCICULAR BLOCK: ICD-10-CM

## 2020-03-06 DIAGNOSIS — H40.9 GLAUCOMA OF RIGHT EYE, UNSPECIFIED GLAUCOMA TYPE: ICD-10-CM

## 2020-03-06 DIAGNOSIS — M81.0 AGE-RELATED OSTEOPOROSIS WITHOUT CURRENT PATHOLOGICAL FRACTURE: ICD-10-CM

## 2020-03-06 DIAGNOSIS — Z71.89 ADVANCE CARE PLANNING: ICD-10-CM

## 2020-03-06 NOTE — ACP (ADVANCE CARE PLANNING)
Advance Care Planning (ACP) Provider Note - Comprehensive     Date of ACP Conversation: 03/06/20  Persons included in Conversation:  patient  Length of ACP Conversation in minutes:  16 minutes    Authorized Decision Maker (if patient is incapable of making informed decisions): This person is:  her             General ACP for ALL Patients with Decision Making Capacity:   Importance of advance care planning, including choosing a healthcare agent to communicate patient's healthcare decisions if patient lost the ability to make decisions, such as after a sudden illness or accident  Understanding of the healthcare agent role was assessed and information provided  Exploration of values, goals, and preferences if recovery is not expected, even with continued medical treatment in the event of: Imminent death  Severe, permanent brain injury  \"In these circumstances, what matters most to you? \"  Care focused more on comfort or quality of life. \"What, if any, treatments would you want to avoid? \" none  Opportunity offered to explore how cultural, Mandaeism, spiritual, or personal beliefs would affect decisions for future care     Review of Existing Advance Directive:  What information were you given about medical decisions to consider before completing your advance directive? conversation starter    For Serious or Chronic Illness:  Understanding of medical condition      Interventions Provided:  Recommended completion of Advance Directive form after review of ACP materials and conversation with prospective healthcare agent

## 2020-03-06 NOTE — PROGRESS NOTES
Latonia Waggoner presents today for   Chief Complaint   Patient presents with    Annual Wellness Visit       Is someone accompanying this pt? no    Is the patient using any DME equipment during OV? no    Depression Screening:  3 most recent PHQ Screens 6/24/2019   Little interest or pleasure in doing things Not at all   Feeling down, depressed, irritable, or hopeless Not at all   Total Score PHQ 2 0       Learning Assessment:  Learning Assessment 6/24/2019   PRIMARY LEARNER Patient   BARRIERS PRIMARY LEARNER NONE   CO-LEARNER CAREGIVER No   PRIMARY LANGUAGE ENGLISH   LEARNER PREFERENCE PRIMARY DEMONSTRATION   ANSWERED BY Patient   RELATIONSHIP SELF       Abuse Screening:  Abuse Screening Questionnaire 6/24/2019   Do you ever feel afraid of your partner? N   Are you in a relationship with someone who physically or mentally threatens you? N   Is it safe for you to go home? Y       Fall Risk  Fall Risk Assessment, last 12 mths 3/6/2020   Able to walk? Yes   Fall in past 12 months? No       Health Maintenance reviewed and discussed and ordered per Provider. Health Maintenance Due   Topic Date Due    Hepatitis C Screening  1945    DTaP/Tdap/Td series (1 - Tdap) 12/11/1957    Colonoscopy  12/11/1964    Shingrix Vaccine Age 50> (1 of 2) 12/11/1996    GLAUCOMA SCREENING Q2Y  12/11/2011    Influenza Age 9 to Adult  08/01/2019    Medicare Yearly Exam  03/21/2020   . Coordination of Care:  1. Have you been to the ER, urgent care clinic since your last visit? Hospitalized since your last visit? no    2. Have you seen or consulted any other health care providers outside of the [address] [place] [address] since your last visit? Include any pap smears or colon screening.  no      Last  Checked na  Last UDS Checked na  Last Pain contract signed: na    Annual Medicare Wellness Exam

## 2020-03-06 NOTE — PROGRESS NOTES
(AWV) The Medicare Annual Wellness Exam PROGRESS NOTE    This is a Medicare Annual Wellness Exam (AWV)     I have reviewed the patient's medical history in detail and updated the computerized patient record. Mónica Flannery is a 69 y.o.  female and presents for an annual wellness exam     ROS   General ROS: negative for - chills, fatigue or fever  Psychological ROS: negative for - anxiety or depression  Ophthalmic ROS: positive for - uses glasses and using lumigan drops for glaucoma  ENT ROS: negative for - headaches, nasal congestion, sore throat: + tinnitus; + post nasal drip  Endocrine ROS: negative for - polydipsia/polyuria or temperature intolerance  Respiratory ROS: no cough, shortness of breath, or wheezing  Cardiovascular ROS: no chest pain or dyspnea on exertion  Gastrointestinal ROS: no abdominal pain, change in bowel habits, or black or bloody stools  Genito-Urinary ROS: no dysuria, trouble voiding, or hematuria  Musculoskeletal ROS: positive for - joint pain; lump on right wrist  Neurological ROS: negative for - numbness/tingling or weakness  Dermatological ROS: positive for - eczema and h/o squamous cell on the left side of the face s/p mohs procedure    All other systems reviewed and are negative. History     Past Medical History:   Diagnosis Date    Menopause       History reviewed. No pertinent surgical history. Current Outpatient Medications   Medication Sig Dispense Refill    bimatoprost (LUMIGAN) 0.01 % ophthalmic drops Administer 1 Drop to both eyes every evening.  alendronate (FOSAMAX) 70 mg tablet Take 1 Tab by mouth every seven (7) days. 12 Tab 3    calcium-cholecalciferol, D3, (CALTRATE 600+D) tablet Take 1 Tab by mouth two (2) times a day.  180 Tab 4     No Known Allergies  Family History   Problem Relation Age of Onset    Breast Cancer Mother 48    Cancer Father         colon     Social History     Tobacco Use    Smoking status: Never Smoker    Smokeless tobacco: Never Used   Substance Use Topics    Alcohol use: Yes     Patient Active Problem List   Diagnosis Code    Advance care planning Z71.89    Age related osteoporosis M81.0       Health Maintenance History  Immunizations reviewed, dtap up to date , pneumovax up to date, flu , zoster due  Colonoscopy: up to date,   Eye exam: up to date  Mammo up to date  Dexascan up to date    Depression Risk Factor Screening:      Patient Health Questionnaire (PHQ-2)   Over the last 2 weeks, how often have you been bothered by any of the following problems? · Little interest or pleasure in doing things? · Not at all. [0]  · Feeling down, depressed, or hopeless? · Not at all. [0]    Total Score: 0/6  PHQ-2 Assessment Scoring:   A score of 2 or more requires further screening with the PHQ-9    Alcohol Risk Factor Screening:     Women: On any occasion during the past 3 months, have you had more than 3 drinks containing alcohol? no   Do you average more than 7 drinks per week? no    Functional Ability and Level of Safety:     Hearing Loss    Hearing is good. Activities of Daily Living   Self-care. Requires assistance with: no ADLs    Fall Risk   No fall risk factors    Abuse Screen   Patient is not abused    Examination   Physical Examination  Vitals:    03/06/20 0828   BP: 114/81   Pulse: 98   Resp: 16   Temp: 98.3 °F (36.8 °C)   TempSrc: Oral   SpO2: 98%   Weight: 117 lb (53.1 kg)   Height: 5' 1\" (1.549 m)   PainSc:   0 - No pain      Body mass index is 22.11 kg/m².      Evaluation of Cognitive Function:  Mood/affect:good mood with appropriate affect  Appearance:well kempt  Family member/caregiver input: n/a    alert, well appearing, and in no distress, oriented to person, place, and time and normal appearing weight    Patient Care Team:  Catarino Brenner MD as PCP - General (Family Practice)  Catarino Brenner MD as PCP - [facility] Empaneled Provider    End-of-life planning  Advanced Directive in the case than an injury or illness causes the patient to be unable to make health care decisions    Health Care Directive or Living Will: yes    Advice/Referrals/Counselling/Plan:   Education and counseling provided:  End-of-Life planning (with patient's consent)  Screening Mammography  Bone mass measurement (DEXA)  Diabetes screening test  Include in education list (weight loss, physical activity, smoking cessation, fall prevention, and nutrition)    ICD-10-CM ICD-9-CM    1. Medicare annual wellness visit, subsequent Z00.00 V70.0 [address]   2. Age-related osteoporosis without current pathological fracture M81.0 733.01    3. Glaucoma of right eye, unspecified glaucoma type H40.9 365.9    4. Hypercholesterolemia E78.00 272.0    5. Advance care planning Z71.89 V65.49 ADVANCE CARE PLANNING FIRST 30 MINS   6. Left anterior fascicular block I44.4 426.2 AMB POC EKG ROUTINE W/ 12 LEADS, INTER & REP      NUCLEAR CARDIAC STRESS TEST   . Brief written plan, checklist    I have discussed the diagnosis with the patient and the intended plan as seen in the above orders. The patient has received an after-visit summary and questions were answered concerning future plans. I have discussed medication side effects and warnings with the patient as well. I have reviewed the plan of care with the patient, accepted their input and they are in agreement with the treatment goals. ___________________________________________________________    Problem Assessment    for treatment of   Chief Complaint   Patient presents with    Annual Wellness Visit     SUBJECTIVE    Well Adult Physical   Patient here for a comprehensive physical exam.The patient reports problems - glaucoma, allergic rhinitis, h/o squamous cell carcinoma, eczema, osteopenia  Do you take any herbs or supplements that were not prescribed by a doctor? no Are you taking calcium supplements?  yes Are you taking aspirin daily? not applicable    Followed by dermatology for eczema  Followed by gastroenterology for family h/o colon cancer      Visit Vitals  /81 (BP 1 Location: Right arm, BP Patient Position: Sitting)   Pulse 98   Temp 98.3 °F (36.8 °C) (Oral)   Resp 16   Ht 5' 1\" (1.549 m)   Wt 117 lb (53.1 kg)   SpO2 98%   BMI 22.11 kg/m²     General:  Alert, cooperative, no distress, appears stated age. Head:  Normocephalic, without obvious abnormality, atraumatic. Eyes:  Conjunctivae/corneas clear. PERRL, EOMs intact. Ears:  Normal TMs and external ear canals both ears. Nose: Nares normal. Septum midline. Mucosa normal. No drainage or sinus tenderness. Throat: Lips, mucosa, and tongue normal. Teeth and gums normal.   Neck: Supple, symmetrical, trachea midline, no adenopathy, thyroid: no enlargement/tenderness/nodules, no carotid bruit and no JVD. Back:   Symmetric, no curvature. ROM normal. No CVA tenderness. Lungs:   Clear to auscultation bilaterally. Chest wall:  No tenderness or deformity. Heart:  Regular rate and rhythm, S1, S2 normal, no murmur, click, rub or gallop. Breast Exam:  Not examined   Abdomen:   Soft, non-tender. Bowel sounds normal. No masses,  No organomegaly. Genitalia:  deferred   Rectal:  deferred   Extremities: Extremities normal, atraumatic, no cyanosis or edema. Pulses: 2+ and symmetric all extremities. Skin: Skin color, texture, turgor normal. No rashes or lesions. Lymph nodes: Cervical, supraclavicular, and axillary nodes normal.   Neurologic: CNII-XII intact. Normal strength, sensation and reflexes throughout. Assessment/Plan:    1. Medicare annual wellness visit, subsequent  Reviewed preventive recommendations  - [address] [address]    2. Age-related osteoporosis without current pathological fracture  Continue treatment    3. Glaucoma of right eye, unspecified glaucoma type  F/u with ophthalmology    4. Hypercholesterolemia  Healthy diet    5. Advance care planning  See ACP  - ADVANCE CARE PLANNING FIRST 30 MINS    6.  Left anterior fascicular block  Ongoing fascicular block; assess heart motion with stress test  - AMB POC EKG ROUTINE W/ 12 LEADS, INTER & REP  - NUCLEAR CARDIAC STRESS TEST; Future    Lab review: no lab studies available for review at time of visit

## 2020-07-02 ENCOUNTER — IMPORTED ENCOUNTER (OUTPATIENT)
Dept: URBAN - METROPOLITAN AREA CLINIC 1 | Facility: CLINIC | Age: 75
End: 2020-07-02

## 2020-07-02 PROBLEM — H40.1131: Noted: 2020-07-02

## 2020-07-02 PROBLEM — H35.361: Noted: 2020-07-02

## 2020-07-02 PROBLEM — H04.123: Noted: 2020-07-02

## 2020-07-02 PROBLEM — H43.811: Noted: 2020-07-02

## 2020-07-02 PROBLEM — Z96.1: Noted: 2020-07-02

## 2020-07-02 PROCEDURE — 92014 COMPRE OPH EXAM EST PT 1/>: CPT

## 2020-07-02 PROCEDURE — 92133 CPTRZD OPH DX IMG PST SGM ON: CPT

## 2020-07-02 NOTE — PATIENT DISCUSSION
1.  Mild Open Angle Glaucoma OU (CD 0.60/0.65) - No significant change by OCT. IOP improved with Lumigan cont QHS OU. Patient to continue with current gtt regimen. Patient advised to be compliant with gtts. Condition was discussed with patient and patient understands. Will continue to monitor patient for any progression in condition. Patient was advised to call us with any problems questions or concerns. 2.  Dry Eyes OU - Recommend ATs TID OU routinely 3. Pseudophakia OU - w/ LenSx OU H/o YAG Cap OU  4. Macular Drusen OD- Observe 5. PVD w/o Tear OD - RD precautions. Patient deferred Manifest Rx today. Return for an appointment in 6 months 10/HVF with Dr. Ly Gomez.

## 2020-12-22 ENCOUNTER — HOSPITAL ENCOUNTER (OUTPATIENT)
Dept: MAMMOGRAPHY | Age: 75
Discharge: HOME | End: 2020-12-22
Payer: MEDICARE

## 2020-12-22 DIAGNOSIS — Z12.31 VISIT FOR SCREENING MAMMOGRAM: ICD-10-CM

## 2020-12-22 PROCEDURE — 77063 BREAST TOMOSYNTHESIS BI: CPT

## 2021-01-13 DIAGNOSIS — M81.0 AGE-RELATED OSTEOPOROSIS WITHOUT CURRENT PATHOLOGICAL FRACTURE: ICD-10-CM

## 2021-01-14 ENCOUNTER — IMPORTED ENCOUNTER (OUTPATIENT)
Dept: URBAN - METROPOLITAN AREA CLINIC 1 | Facility: CLINIC | Age: 76
End: 2021-01-14

## 2021-01-14 PROBLEM — H04.123: Noted: 2021-01-14

## 2021-01-14 PROBLEM — H16.143: Noted: 2021-01-14

## 2021-01-14 PROBLEM — H40.1131: Noted: 2021-01-14

## 2021-01-14 PROCEDURE — 92015 DETERMINE REFRACTIVE STATE: CPT

## 2021-01-14 PROCEDURE — 92012 INTRM OPH EXAM EST PATIENT: CPT

## 2021-01-14 PROCEDURE — 92083 EXTENDED VISUAL FIELD XM: CPT

## 2021-01-14 RX ORDER — ALENDRONATE SODIUM 70 MG/1
TABLET ORAL
Qty: 12 TAB | Refills: 2 | Status: SHIPPED | OUTPATIENT
Start: 2021-01-14 | End: 2021-10-01

## 2021-01-14 NOTE — PATIENT DISCUSSION
1.  Mild Open Angle Glaucoma OU (CD 0.60/0.70) - HVF shows early nasal step vs artifact OD WNL OS. IOP slightly elevated despite compliance. Continue Lumigan QHS OU. Patient advised to be compliant with gtts. Condition was discussed with patient and patient understands. Will continue to monitor patient for any progression in condition. Patient was advised to call us with any problems questions or concerns. 2.  MADELYN w/ PEK OU- Recommend increase PF ATs Q3H OU routinely. Consider Restasis without improvement 3. Pseudophakia OU - w/ LenSx OU H/o YAG Cap OU  4. Macular Drusen OD- Observe 5. PVD w/o Tear OD - RD precautions. MRX for glasses given. Return for an appointment in 6 months 30/OCT with Dr. Nasim Eddy.

## 2021-07-26 ENCOUNTER — IMPORTED ENCOUNTER (OUTPATIENT)
Dept: URBAN - METROPOLITAN AREA CLINIC 1 | Facility: CLINIC | Age: 76
End: 2021-07-26

## 2021-07-26 PROBLEM — H04.123: Noted: 2021-07-26

## 2021-07-26 PROBLEM — H40.1131: Noted: 2021-07-26

## 2021-07-26 PROBLEM — H16.143: Noted: 2021-07-26

## 2021-07-26 PROCEDURE — 99214 OFFICE O/P EST MOD 30 MIN: CPT

## 2021-07-26 PROCEDURE — 92133 CPTRZD OPH DX IMG PST SGM ON: CPT

## 2021-07-26 NOTE — PATIENT DISCUSSION
1.  Mild Open Angle Glaucoma OU (CD 0.60/0.70) -- No significant progression OU by OCT today. IOP stable. Continue Lumigan QHS OU. Patient advised to be compliant with gtts. Condition was discussed with patient and patient understands. Will continue to monitor patient for any progression in condition. Patient was advised to call us with any problems questions or concerns. 2.  MADELYN w/ PEK OU -- Continue PF ATs Q3H OU routinely. Consider Restasis without improvement 3. Pseudophakia OU -- w/ LenSx OU H/o YAG Cap OU  4. Macular Drusen OD -- Observe 5. PVD w/o Tear OD -- RD precautions. Patient defers MrxReturn for an appointment in 6 months 10/HVF 24-2 with Dr. Olga Mistry.

## 2021-09-30 DIAGNOSIS — M81.0 AGE-RELATED OSTEOPOROSIS WITHOUT CURRENT PATHOLOGICAL FRACTURE: ICD-10-CM

## 2021-10-01 RX ORDER — ALENDRONATE SODIUM 70 MG/1
TABLET ORAL
Qty: 12 TABLET | Refills: 2 | Status: SHIPPED | OUTPATIENT
Start: 2021-10-01 | End: 2022-06-15

## 2021-10-26 ENCOUNTER — HOSPITAL ENCOUNTER (OUTPATIENT)
Dept: LAB | Age: 76
Discharge: HOME | End: 2021-10-26
Payer: MEDICARE

## 2021-10-26 ENCOUNTER — OFFICE VISIT (OUTPATIENT)
Dept: FAMILY MEDICINE CLINIC | Age: 76
End: 2021-10-26
Payer: MEDICARE

## 2021-10-26 VITALS
RESPIRATION RATE: 16 BRPM | DIASTOLIC BLOOD PRESSURE: 80 MMHG | HEART RATE: 76 BPM | TEMPERATURE: 98 F | WEIGHT: 115 LBS | BODY MASS INDEX: 21.71 KG/M2 | OXYGEN SATURATION: 98 % | SYSTOLIC BLOOD PRESSURE: 136 MMHG | HEIGHT: 61 IN

## 2021-10-26 DIAGNOSIS — Z11.59 ENCOUNTER FOR HEPATITIS C SCREENING TEST FOR LOW RISK PATIENT: ICD-10-CM

## 2021-10-26 DIAGNOSIS — D75.89 MACROCYTOSIS: ICD-10-CM

## 2021-10-26 DIAGNOSIS — M81.0 AGE-RELATED OSTEOPOROSIS WITHOUT CURRENT PATHOLOGICAL FRACTURE: ICD-10-CM

## 2021-10-26 DIAGNOSIS — H40.9 GLAUCOMA OF RIGHT EYE, UNSPECIFIED GLAUCOMA TYPE: ICD-10-CM

## 2021-10-26 DIAGNOSIS — E78.00 HYPERCHOLESTEREMIA: ICD-10-CM

## 2021-10-26 DIAGNOSIS — I44.7 LEFT BUNDLE BRANCH BLOCK (LBBB): ICD-10-CM

## 2021-10-26 DIAGNOSIS — Z00.00 MEDICARE ANNUAL WELLNESS VISIT, INITIAL: Primary | ICD-10-CM

## 2021-10-26 DIAGNOSIS — Z71.89 ADVANCE CARE PLANNING: ICD-10-CM

## 2021-10-26 DIAGNOSIS — E78.00 HYPERCHOLESTEROLEMIA: ICD-10-CM

## 2021-10-26 DIAGNOSIS — Z12.11 COLON CANCER SCREENING: ICD-10-CM

## 2021-10-26 LAB
BASOPHILS # BLD: 0.1 K/UL (ref 0–0.1)
BASOPHILS NFR BLD: 1 % (ref 0–2)
CHOLEST SERPL-MCNC: 189 MG/DL
DIFFERENTIAL METHOD BLD: ABNORMAL
EOSINOPHIL # BLD: 0.1 K/UL (ref 0–0.4)
EOSINOPHIL NFR BLD: 1 % (ref 0–5)
ERYTHROCYTE [DISTWIDTH] IN BLOOD BY AUTOMATED COUNT: 12.5 % (ref 11.6–14.5)
HCT VFR BLD AUTO: 45.5 % (ref 35–45)
HDLC SERPL-MCNC: 83 MG/DL (ref 40–60)
HDLC SERPL: 2.3 {RATIO} (ref 0–5)
HGB BLD-MCNC: 14.8 G/DL (ref 12–16)
LDLC SERPL CALC-MCNC: 93.4 MG/DL (ref 0–100)
LIPID PROFILE,FLP: ABNORMAL
LYMPHOCYTES # BLD: 2 K/UL (ref 0.9–3.6)
LYMPHOCYTES NFR BLD: 30 % (ref 21–52)
MCH RBC QN AUTO: 33.5 PG (ref 24–34)
MCHC RBC AUTO-ENTMCNC: 32.5 G/DL (ref 31–37)
MCV RBC AUTO: 102.9 FL (ref 78–100)
MONOCYTES # BLD: 0.5 K/UL (ref 0.05–1.2)
MONOCYTES NFR BLD: 8 % (ref 3–10)
NEUTS SEG # BLD: 4 K/UL (ref 1.8–8)
NEUTS SEG NFR BLD: 60 % (ref 40–73)
PLATELET # BLD AUTO: 269 K/UL (ref 135–420)
PMV BLD AUTO: 9.3 FL (ref 9.2–11.8)
RBC # BLD AUTO: 4.42 M/UL (ref 4.2–5.3)
TRIGL SERPL-MCNC: 63 MG/DL (ref ?–150)
VLDLC SERPL CALC-MCNC: 12.6 MG/DL
WBC # BLD AUTO: 6.6 K/UL (ref 4.6–13.2)

## 2021-10-26 PROCEDURE — 99214 OFFICE O/P EST MOD 30 MIN: CPT | Performed by: FAMILY MEDICINE

## 2021-10-26 PROCEDURE — 80061 LIPID PANEL: CPT

## 2021-10-26 PROCEDURE — 36415 COLL VENOUS BLD VENIPUNCTURE: CPT

## 2021-10-26 PROCEDURE — G0439 PPPS, SUBSEQ VISIT: HCPCS | Performed by: FAMILY MEDICINE

## 2021-10-26 PROCEDURE — G8432 DEP SCR NOT DOC, RNG: HCPCS | Performed by: FAMILY MEDICINE

## 2021-10-26 PROCEDURE — 1101F PT FALLS ASSESS-DOCD LE1/YR: CPT | Performed by: FAMILY MEDICINE

## 2021-10-26 PROCEDURE — 1090F PRES/ABSN URINE INCON ASSESS: CPT | Performed by: FAMILY MEDICINE

## 2021-10-26 PROCEDURE — G9899 SCRN MAM PERF RSLTS DOC: HCPCS | Performed by: FAMILY MEDICINE

## 2021-10-26 PROCEDURE — G8420 CALC BMI NORM PARAMETERS: HCPCS | Performed by: FAMILY MEDICINE

## 2021-10-26 PROCEDURE — 86803 HEPATITIS C AB TEST: CPT

## 2021-10-26 PROCEDURE — 3017F COLORECTAL CA SCREEN DOC REV: CPT | Performed by: FAMILY MEDICINE

## 2021-10-26 PROCEDURE — 99497 ADVNCD CARE PLAN 30 MIN: CPT | Performed by: FAMILY MEDICINE

## 2021-10-26 PROCEDURE — G8536 NO DOC ELDER MAL SCRN: HCPCS | Performed by: FAMILY MEDICINE

## 2021-10-26 PROCEDURE — 85025 COMPLETE CBC W/AUTO DIFF WBC: CPT

## 2021-10-26 PROCEDURE — G8427 DOCREV CUR MEDS BY ELIG CLIN: HCPCS | Performed by: FAMILY MEDICINE

## 2021-10-26 NOTE — PROGRESS NOTES
Thelma Tidwell presents today for   Chief Complaint   Patient presents with    Annual Wellness Visit       Is someone accompanying this pt? no    Is the patient using any DME equipment during OV? no    Depression Screening:  3 most recent PHQ Screens 10/26/2021   Little interest or pleasure in doing things Not at all   Feeling down, depressed, irritable, or hopeless Not at all   Total Score PHQ 2 0       Learning Assessment:  Learning Assessment 6/24/2019   PRIMARY LEARNER Patient   BARRIERS PRIMARY LEARNER [address] CAREGIVER No   PRIMARY LANGUAGE ENGLISH   LEARNER PREFERENCE PRIMARY DEMONSTRATION   ANSWERED BY Patient   RELATIONSHIP SELF       Abuse Screening:  Abuse Screening Questionnaire 6/24/2019   Do you ever feel afraid of your partner? N   Are you in a relationship with someone who physically or mentally threatens you? N   Is it safe for you to go home? Y       Fall Risk  Fall Risk Assessment, last 12 mths 10/26/2021   Able to walk? Yes   Fall in past 12 months? 0   Do you feel unsteady? 0   Are you worried about falling 0       Health Maintenance reviewed and discussed and ordered per Provider. Health Maintenance Due   Topic Date Due    Hepatitis C Screening  Never done    COVID-19 Vaccine (1) Never done    DTaP/Tdap/Td series (1 - Tdap) Never done    Colorectal Cancer Screening Combo  Never done    Shingrix Vaccine Age 50> (1 of 2) Never done    Pneumococcal 65+ years (1 of 1 - PPSV23) Never done    Medicare Yearly Exam  03/07/2021    Flu Vaccine (1) Never done   . Coordination of Care:  1. Have you been to the ER, urgent care clinic since your last visit? Hospitalized since your last visit? no    2. Have you seen or consulted any other health care providers outside of the [address] [place] [address] since your last visit? Include any pap smears or colon screening.  no      Last  Checked na  Last UDS Checked na  Last Pain contract signed: na    Annual Medicare Wellness Exam  Med refill

## 2021-10-26 NOTE — ACP (ADVANCE CARE PLANNING)
Advance Care Planning     Advance Care Planning (ACP) Physician/NP/PA Conversation      Date of Conversation: 10/26/2021  Conducted with: Patient with Decision Making Capacity    Healthcare Decision Maker:     Primary Decision Maker: Dr Brandie Morse - Spouse - [phone]    Secondary Decision Maker: Sonu Byers - Son - [phone]    Secondary Decision Maker: Florentino Crigler - Son - [phone]  Click here to complete [address] including selection of the Healthcare Decision Maker Relationship (ie \"Primary\")      Today we documented Decision Maker(s) consistent with Legal Next of Kin hierarchy. Care Preferences:    Hospitalization: \"If your health worsens and it becomes clear that your chance of recovery is unlikely, what would be your preference regarding hospitalization? \"  The patient would prefer hospitalization. Ventilation: \"If you were unable to breathe on your own and your chance of recovery was unlikely, what would be your preference about the use of a ventilator (breathing machine) if it was available to you? \"   The patient would desire the use of a ventilator. Resuscitation: \"In the event your heart stopped as a result of an underlying serious health condition, would you want attempts to be made to restart your heart, or would you prefer a natural death? \"   Yes, attempt to resuscitate.     Additional topics discussed: treatment goals, benefit/burden of treatment options, ventilation preferences, hospitalization preferences and resuscitation preferences    Conversation Outcomes / Follow-Up Plan:   ACP complete - no further action today  Reviewed DNR/DNI and patient elects Full Code (Attempt Resuscitation)     Length of Voluntary ACP Conversation in minutes:  16 minutes    Edson Burns MD

## 2021-10-26 NOTE — PROGRESS NOTES
(AWV) The Initial Medicare Annual Wellness Exam PROGRESS NOTE    This is an Initial Medicare Annual Wellness Exam (AWV)     I have reviewed the patient's medical history in detail and updated the computerized patient record. Evette Tilley is a 77 y.o.  female and presents for an annual wellness exam     ROS   General ROS: negative for - chills, fatigue or fever  Psychological ROS: negative for - anxiety or depression  Ophthalmic ROS: positive for - uses glasses and using lumigan drops for glaucoma  ENT ROS: negative for - headaches, nasal congestion, sore throat: improved tinnitus; + post nasal drip  Endocrine ROS: negative for - polydipsia/polyuria or temperature intolerance  Respiratory ROS: no cough, shortness of breath, or wheezing  Cardiovascular ROS: no chest pain or dyspnea on exertion  Gastrointestinal ROS: no abdominal pain, change in bowel habits, or black or bloody stools  Genito-Urinary ROS: no dysuria, trouble voiding, or hematuria  Musculoskeletal ROS: positive for - joint pain; lump on right wrist  Neurological ROS: negative for - numbness/tingling or weakness; gradually worsening of balance  Dermatological ROS: positive for - eczema and h/o squamous cell on the left side of the face s/p mohs procedure    All other systems reviewed and are negative. History     Past Medical History:   Diagnosis Date    Menopause       No past surgical history on file. Current Outpatient Medications   Medication Sig Dispense Refill    alendronate (FOSAMAX) 70 mg tablet TAKE 1 TABLET BY MOUTH ONCE WEEKLY ON AN EMPTY STOMACH BEFORE BREAKFAST. REMAIN UPRIGHT FOR 30 MINUTES & TAKE WITH 8 OUNCES OF WATER 12 Tablet 2    bimatoprost (LUMIGAN) 0.01 % ophthalmic drops Administer 1 Drop to both eyes every evening.  calcium-cholecalciferol, D3, (CALTRATE 600+D) tablet Take 1 Tab by mouth two (2) times a day.  180 Tab 4     No Known Allergies  Family History   Problem Relation Age of Onset    Breast Cancer Mother 48    Cancer Father         colon     Social History     Tobacco Use    Smoking status: Never Smoker    Smokeless tobacco: Never Used   Substance Use Topics    Alcohol use: Yes     Patient Active Problem List   Diagnosis Code    Advance care planning Z71.89    Age related osteoporosis M81.0       Health Maintenance History  Immunizations reviewed, dtap due , pneumovax up to date, flu due, zoster due  Colonoscopy: cologuard ordered,   Eye exam: up to date  Mammo up to date  Dexascan up to date  covid booster due    Depression Risk Factor Screening:      Patient Health Questionnaire (PHQ-2)   Over the last 2 weeks, how often have you been bothered by any of the following problems? · Little interest or pleasure in doing things? · Not at all. [0]  · Feeling down, depressed, or hopeless? · Not at all. [0]    Total Score: 0/6  PHQ-2 Assessment Scoring:   A score of 2 or more requires further screening with the PHQ-9    Alcohol Risk Factor Screening:     Women: On any occasion during the past 3 months, have you had more than 3 drinks containing alcohol?  no   Do you average more than 7 drinks per week?  no    Functional Ability and Level of Safety:     Hearing Loss    Hearing is good. Activities of Daily Living   Self-care. Requires assistance with: no ADLs    Fall Risk   Secondary diagnoses (15 pts)  Score: 15    Abuse Screen   Patient is not abused    Examination   Physical Examination  Vitals:    10/26/21 0852   BP: 136/80   Pulse: 76   Resp: 16   Temp: 98 °F (36.7 °C)   TempSrc: Temporal   SpO2: 98%   Weight: 115 lb (52.2 kg)   Height: 5' 1\" (1.549 m)   PainSc:   0 - No pain      Body mass index is 21.73 kg/m².      Evaluation of Cognitive Function:  Mood/affect: good mood with appropriate affect  Appearance: well kempt  Family member/caregiver input: n/a    alert, well appearing, and in no distress, oriented to person, place, and time and normal appearing weight    Patient Care Team:  Mohini Iverson MD ALVARADO as PCP - General (Family Medicine)  Diana Nolan MD as PCP - [address] [place] [address] Dr Russ Provider    End-of-life planning  Advanced Directive in the case than an injury or illness causes the patient to be unable to make health care decisions    Health Care Directive or Living Will: yes    Advice/Referrals/Counselling/Plan:   Education and counseling provided:  End-of-Life planning (with patient's consent)  Influenza Vaccine  Bone mass measurement (DEXA)  Diabetes screening test  covid vaccine  Include in education list (weight loss, physical activity, smoking cessation, fall prevention, and nutrition)    ICD-10-CM ICD-9-CM    1. Medicare annual wellness visit, initial  Z00.00 V70.0    2. Advance care planning  Z71.89 V65.49    3. Colon cancer screening  Z12.11 V76.51 COLOGUARD TEST (FECAL DNA COLORECTAL CANCER SCREENING)   4. Age-related osteoporosis without current pathological fracture  M81.0 733.01    5. Glaucoma of right eye, unspecified glaucoma type  H40.9 365.9    6. Hypercholesterolemia  E78.00 272.0    7. Macrocytosis  D75.89 289.89 CBC WITH AUTOMATED DIFF   8. Encounter for hepatitis C screening test for low risk patient  Z11.59 V73.89 HEPATITIS C AB   9. Hypercholesteremia  E78.00 272.0 LIPID PANEL   10. Left bundle branch block (LBBB)  I44.7 426.3 REFERRAL TO CARDIOLOGY   . Brief written plan, checklist    I have discussed the diagnosis with the patient and the intended plan as seen in the above orders. The patient has received an after-visit summary and questions were answered concerning future plans. I have discussed medication side effects and warnings with the patient as well. I have reviewed the plan of care with the patient, accepted their input and they are in agreement with the treatment goals.      ____________________________________________________________    Problem Assessment    for treatment of   Chief Complaint   Patient presents with    Annual Wellness Visit         SUBJECTIVE    Well Adult Physical   Patient here for a comprehensive physical exam.The patient reports problems - balance difficult, glaucoma, allergic rhinitis, h/o squamous cell carcinoma, eczema, osteopenia, LBBB in 2002 on EKG with follow-up echocardiogram  Do you take any herbs or supplements that were not prescribed by a doctor? no Are you taking calcium supplements? yes Are you taking aspirin daily? not applicable    Cardiovascular Review:  The patient has hyperlipidemia and LBBB. Diet and Lifestyle: generally follows a low fat low cholesterol diet, generally follows a low sodium diet, does not rigorously follow a diabetic diet, exercises regularly, nonsmoker  Home BP Monitoring: is not measured at home. Pertinent ROS: taking medications as instructed, no medication side effects noted, no TIA's, no chest pain on exertion, no dyspnea on exertion, no swelling of ankles. Followed by dermatology for eczema  Followed by gastroenterology for family h/o colon cancer      Visit Vitals  /80 (BP 1 Location: Right arm, BP Patient Position: Sitting, BP Cuff Size: Adult)   Pulse 76   Temp 98 °F (36.7 °C) (Temporal)   Resp 16   Ht 5' 1\" (1.549 m)   Wt 115 lb (52.2 kg)   SpO2 98%   BMI 21.73 kg/m²     General:  Alert, cooperative, no distress, appears stated age. Head:  Normocephalic, without obvious abnormality, atraumatic. Eyes:  Conjunctivae/corneas clear. PERRL, EOMs intact. Fundi benign. Ears:  Normal TMs and external ear canals both ears. Nose: Nares normal. Septum midline. Mucosa normal. No drainage or sinus tenderness. Throat: Lips, mucosa, and tongue normal. Teeth and gums normal.   Neck: Supple, symmetrical, trachea midline, no adenopathy, thyroid: no enlargement/tenderness/nodules, no carotid bruit and no JVD. Back:   Symmetric, no curvature. ROM normal. No CVA tenderness. Lungs:   Clear to auscultation bilaterally. Chest wall:  No tenderness or deformity.    Heart:  Regular rate and rhythm, S1, S2 normal, no murmur, click, rub or gallop. Breast Exam:  No tenderness, masses, or nipple abnormality. Abdomen:   Soft, non-tender. Bowel sounds normal. No masses,  No organomegaly. Genitalia:  Normal female without lesion, discharge or tenderness. Rectal:  Normal tone,  no masses or tenderness  Guaiac negative stool. Extremities: Extremities normal, atraumatic, no cyanosis or edema. Pulses: 2+ and symmetric all extremities. Skin: Skin color, texture, turgor normal. No rashes or lesions. Lymph nodes: Cervical, supraclavicular, and axillary nodes normal.   Neurologic: CNII-XII intact. Normal strength, sensation and reflexes throughout. LABS     TESTS      Assessment/Plan:    1. Medicare annual wellness visit, initial  Reviewed preventive recommendations    2. Advance care planning  See ACP    3. Colon cancer screening  Non-invasive screening preferred  - COLOGUARD TEST (FECAL DNA COLORECTAL CANCER SCREENING)    4. Age-related osteoporosis without current pathological fracture  Refer for DEXA scan    5. Glaucoma of right eye, unspecified glaucoma type  F/u with ophthalmologist as scheduled    6. Hypercholesterolemia  Encourage ongoing healthy diet    7. Macrocytosis  Assess for improvement  - CBC WITH AUTOMATED DIFF; Future    8. Encounter for hepatitis C screening test for low risk patient    - HEPATITIS C AB; Future    9. Hypercholesteremia    - LIPID PANEL; Future    10.  Left bundle branch block (LBBB)  Last assessed over 10 years ago; refer for further evaluation and treatment including echocardiogram  - REFERRAL TO CARDIOLOGY    Lab review: orders written for new lab studies as appropriate; see orders

## 2021-10-27 LAB
HCV AB SER IA-ACNC: <0.02 INDEX
HCV AB SERPL QL IA: NEGATIVE
HCV COMMENT,HCGAC: NORMAL

## 2021-11-01 LAB — COLOGUARD TEST, EXTERNAL: NEGATIVE

## 2021-12-07 ENCOUNTER — OFFICE VISIT (OUTPATIENT)
Dept: CARDIOLOGY CLINIC | Age: 76
End: 2021-12-07
Payer: MEDICARE

## 2021-12-07 VITALS
WEIGHT: 115 LBS | SYSTOLIC BLOOD PRESSURE: 133 MMHG | TEMPERATURE: 98.1 F | BODY MASS INDEX: 21.73 KG/M2 | HEART RATE: 89 BPM | OXYGEN SATURATION: 100 % | DIASTOLIC BLOOD PRESSURE: 67 MMHG

## 2021-12-07 DIAGNOSIS — Z76.89 ENCOUNTER TO ESTABLISH CARE: ICD-10-CM

## 2021-12-07 DIAGNOSIS — I44.7 LBBB (LEFT BUNDLE BRANCH BLOCK): ICD-10-CM

## 2021-12-07 DIAGNOSIS — R94.31 ABNORMAL EKG: Primary | ICD-10-CM

## 2021-12-07 PROCEDURE — 1101F PT FALLS ASSESS-DOCD LE1/YR: CPT | Performed by: INTERNAL MEDICINE

## 2021-12-07 PROCEDURE — 93000 ELECTROCARDIOGRAM COMPLETE: CPT | Performed by: INTERNAL MEDICINE

## 2021-12-07 PROCEDURE — G8510 SCR DEP NEG, NO PLAN REQD: HCPCS | Performed by: INTERNAL MEDICINE

## 2021-12-07 PROCEDURE — G9899 SCRN MAM PERF RSLTS DOC: HCPCS | Performed by: INTERNAL MEDICINE

## 2021-12-07 PROCEDURE — G8420 CALC BMI NORM PARAMETERS: HCPCS | Performed by: INTERNAL MEDICINE

## 2021-12-07 PROCEDURE — 3017F COLORECTAL CA SCREEN DOC REV: CPT | Performed by: INTERNAL MEDICINE

## 2021-12-07 PROCEDURE — G8428 CUR MEDS NOT DOCUMENT: HCPCS | Performed by: INTERNAL MEDICINE

## 2021-12-07 PROCEDURE — 99204 OFFICE O/P NEW MOD 45 MIN: CPT | Performed by: INTERNAL MEDICINE

## 2021-12-07 PROCEDURE — 1090F PRES/ABSN URINE INCON ASSESS: CPT | Performed by: INTERNAL MEDICINE

## 2021-12-07 PROCEDURE — G8536 NO DOC ELDER MAL SCRN: HCPCS | Performed by: INTERNAL MEDICINE

## 2021-12-07 NOTE — PROGRESS NOTES
[facility]iovascular Specialists    Ms. Iqra Alex is 25-year-old female with a history of glaucoma    Patient is here today for cardiac evaluation. She denies any prior history of MI or CHF. Patient was sent to me for the evaluation of abnormal EKG. According to patient, she had EKG in 2002 which showed left bundle branch block. She has no cardiac symptoms to report at all. She is able to perform activity of daily living without any limitation. She denies any chest pain, chest tightness, pressure heavy or any shortness of breath. She denies any PND or lower extremity swelling. She denies any palpitation, presyncope or syncope  Denies any nausea, vomiting, abdominal pain, fever, chills, sputum production. No hematuria or other bleeding complaints    Past Medical History:   Diagnosis Date    Glaucoma     Menopause        Review of Systems:  Cardiac symptoms as noted above in HPI. All others negative. Denies fatigue, malaise, skin rash, joint pain, blurring vision, photophobia, neck pain, hemoptysis, chronic cough, nausea, vomiting, hematuria, burning micturition, BRBPR, chronic headaches. Current Outpatient Medications   Medication Sig    alendronate (FOSAMAX) 70 mg tablet TAKE 1 TABLET BY MOUTH ONCE WEEKLY ON AN EMPTY STOMACH BEFORE BREAKFAST. REMAIN UPRIGHT FOR 30 MINUTES & TAKE WITH 8 OUNCES OF WATER    bimatoprost (LUMIGAN) 0.01 % ophthalmic drops Administer 1 Drop to both eyes every evening.  calcium-cholecalciferol, D3, (CALTRATE 600+D) tablet Take 1 Tab by mouth two (2) times a day. No current facility-administered medications for this visit. No past surgical history on file.     Allergies and Sensitivities:  No Known Allergies    Family History:  Family History   Problem Relation Age of Onset    Breast Cancer Mother 48    Cancer Father         colon       Social History:  Social History     Tobacco Use    Smoking status: Never Smoker    Smokeless tobacco: Never Used   Substance Use Topics    Alcohol use: Yes    Drug use: No     She  reports that she has never smoked. She has never used smokeless tobacco.  She  reports current alcohol use. Physical Exam:  BP Readings from Last 3 Encounters:   12/07/21 133/67   10/26/21 136/80   03/06/20 114/81         Pulse Readings from Last 3 Encounters:   12/07/21 89   10/26/21 76   03/06/20 98          Wt Readings from Last 3 Encounters:   12/07/21 52.2 kg (115 lb)   10/26/21 52.2 kg (115 lb)   03/06/20 53.1 kg (117 lb)       Constitutional: Oriented to person, place, and time. HENT: Head: Normocephalic and atraumatic. Eyes: Conjunctivae and extraocular motions are normal.   Neck: No JVD present. Carotid bruit is not appreciated. Cardiovascular: Regular rhythm. No murmur, gallop or rubs appreciated  Lung: Breath sounds normal. No respiratory distress. No ronchi or rales appreciated  Abdominal: No tenderness. No rebound and no guarding. Musculoskeletal: There is no lower extremity edema. No cynosis  Lymphadenopathy:  No cervical or supraclavicular adenopathy appriciated. Neurological: No gross motor deficit noted. Skin: No visible skin rash noted. No Ear discharge noted  Psychiatric: Normal mood and affect. LABS:   @  Lab Results   Component Value Date/Time    WBC 6.6 10/26/2021 09:55 AM    HGB 14.8 10/26/2021 09:55 AM    HCT 45.5 (H) 10/26/2021 09:55 AM    PLATELET 119 03/33/6187 09:55 AM    .9 (H) 10/26/2021 09:55 AM     Lab Results   Component Value Date/Time    Sodium 139 05/04/2010 03:52 PM    Potassium 4.2 05/04/2010 03:52 PM    Chloride 102 05/04/2010 03:52 PM    CO2 29 05/04/2010 03:52 PM    Glucose 72 (L) 05/04/2010 03:52 PM    BUN 22 (H) 05/04/2010 03:52 PM    Creatinine 0.8 05/04/2010 03:52 PM     Lipids Latest Ref Rng & Units 10/26/2021 1/7/2020   Chol, Total <200 MG/ 200(H)   HDL 40 - 60 MG/DL 83(H) 80(H)   LDL 0 - 100 MG/DL 93.4 107. 8(H)   Trig <150 MG/DL 63 61   Chol/HDL Ratio 0 - 5.0   2.3 2.5   Some recent data might be hidden     Lab Results   Component Value Date/Time    ALT (SGPT) 36 2010 03:52 PM     No results found for: HBA1C, SRF7TZXT, DHS0TEVD  No results found for: TSH, TSH2, TSH3, TSHP, TSHEXT    EK21: Sinus rhythm. Level branch block. Nonspecific ST-T changes. No ST changes of ischemia    STRESS TEST (EST, PHARM, NUC, ECHO etc)    CATHETERIZATION    IMPRESSION & PLAN:  Ms. Nicol Stanford is 29-year-old female    Left bundle branch block:  Recent EKG reviewed personally which showed level branch block. EKG from last year also confirmed left lumbars block  Patient tells me that she had EKG in  which also showed lateral branch block  This is chronic in nature. Asymptomatic  However we will proceed with echocardiogram because of abnormal EKG and left arm block to make sure she does not have any abnormal cardiac structure or LV dysfunction  Diagnosis was discussed with patient in detail. Importance of diet and exercise was discussed with patient. This plan was discussed with patient who is in agreement. Thank you for allowing me to participate in patient care. Please feel free to call me if you have any question or concern. Gabriella Peralta MD  Please note: This document has been produced using voice recognition software. Unrecognized errors in transcription may be present.

## 2021-12-07 NOTE — PROGRESS NOTES
Identified pt with two pt identifiers(name and ). Reviewed record in preparation for visit and have obtained necessary documentation. Thelma Tidwell presents today for   Chief Complaint   Patient presents with    New Patient       Thelma Tidwell preferred language for health care discussion is english/other. Personal Protective Equipment:   Personal Protective Equipment was used including: mask-surgical and hands-gloves. Patient was placed on no precaution(s). Patient was masked. Precautions:   Patient currently on None  Patient currently roomed with door closed    Is someone accompanying this pt? no    Is the patient using any DME equipment during [address]? no    Depression Screening:  3 most recent PHQ Screens 2021   Little interest or pleasure in doing things Not at all   Feeling down, depressed, irritable, or hopeless Not at all   Total Score PHQ 2 0       Learning Assessment:  Learning Assessment 2019   PRIMARY LEARNER Patient   BARRIERS PRIMARY LEARNER [address] CAREGIVER No   PRIMARY LANGUAGE ENGLISH   LEARNER PREFERENCE PRIMARY DEMONSTRATION   ANSWERED BY Patient   RELATIONSHIP SELF       Abuse Screening:  Abuse Screening Questionnaire 2019   Do you ever feel afraid of your partner? N   Are you in a relationship with someone who physically or mentally threatens you? N   Is it safe for you to go home? Y       Fall Risk  Fall Risk Assessment, last 12 mths 10/26/2021   Able to walk? Yes   Fall in past 12 months? 0   Do you feel unsteady? 0   Are you worried about falling 0       Pt currently taking Anticoagulant therapy? no    Coordination of Care:  1. Have you been to the ER, urgent care clinic since your last visit? Hospitalized since your last visit? no    2. Have you seen or consulted any other health care providers outside of the [address] [place] [address] since your last visit? Include any pap smears or colon screening.  no      Please see Red banners under Allergies and Med Rec to remove outside inquires. All correct information has been verified with patient and added to chart.      Medication's patient's would liked removed has been marked not taking to be removed per Verbal order and read back per Lorri Sánchez MD

## 2021-12-07 NOTE — LETTER
12/7/2021    Patient: Irina Monroy   YOB: 1945   Date of Visit: 12/7/2021     Dorothy Hayes MD  [address] [place] [address]    Dear Dorothy Hayes MD,      Thank you for referring Ms. Kerri Quintana to CARDIO SPECIALIST AT [facility] for evaluation. My notes for this consultation are attached. If you have questions, please do not hesitate to call me. I look forward to following your patient along with you.       Sincerely,    John Arora MD

## 2021-12-10 ENCOUNTER — PATIENT MESSAGE (OUTPATIENT)
Dept: FAMILY MEDICINE CLINIC | Age: 76
End: 2021-12-10

## 2021-12-23 ENCOUNTER — HOSPITAL ENCOUNTER (OUTPATIENT)
Dept: WOMENS IMAGING | Age: 76
Discharge: HOME | End: 2021-12-23
Payer: MEDICARE

## 2021-12-23 DIAGNOSIS — Z12.31 BREAST CANCER SCREENING BY MAMMOGRAM: ICD-10-CM

## 2021-12-23 PROCEDURE — 77063 BREAST TOMOSYNTHESIS BI: CPT

## 2022-01-03 ENCOUNTER — TELEPHONE (OUTPATIENT)
Dept: CARDIOLOGY CLINIC | Age: 77
End: 2022-01-03

## 2022-01-18 ENCOUNTER — TELEPHONE (OUTPATIENT)
Dept: CARDIOLOGY CLINIC | Age: 77
End: 2022-01-18

## 2022-01-18 NOTE — TELEPHONE ENCOUNTER
Attempted to contact pt at  number, no answer. m for pt to return call to office at [phone]. Will continue to try to contact pt.

## 2022-01-18 NOTE — TELEPHONE ENCOUNTER
----- Message from Saurabh Velasquez MD sent at 1/4/2022 12:57 PM EST -----  Hello,  Pls have her follow up echo in 3 weeks for pericardial effusion  Mean while have her take ibuprofen 400 mg BID for two weeks ( with food and fluid)    Thanks

## 2022-01-18 NOTE — TELEPHONE ENCOUNTER
Contacted pt at [facility] number. Two patient Identifiers confirmed. Advised pt per Dr Cy Dias. Pt verbalized understanding.

## 2022-02-17 ENCOUNTER — OFFICE VISIT (OUTPATIENT)
Dept: CARDIOLOGY CLINIC | Age: 77
End: 2022-02-17
Payer: MEDICARE

## 2022-02-17 VITALS
WEIGHT: 115 LBS | BODY MASS INDEX: 21.73 KG/M2 | RESPIRATION RATE: 16 BRPM | TEMPERATURE: 97.6 F | HEART RATE: 82 BPM | SYSTOLIC BLOOD PRESSURE: 128 MMHG | DIASTOLIC BLOOD PRESSURE: 62 MMHG | OXYGEN SATURATION: 98 %

## 2022-02-17 DIAGNOSIS — I31.39 PERICARDIAL EFFUSION: Primary | ICD-10-CM

## 2022-02-17 PROCEDURE — G8420 CALC BMI NORM PARAMETERS: HCPCS | Performed by: INTERNAL MEDICINE

## 2022-02-17 PROCEDURE — 1101F PT FALLS ASSESS-DOCD LE1/YR: CPT | Performed by: INTERNAL MEDICINE

## 2022-02-17 PROCEDURE — G8510 SCR DEP NEG, NO PLAN REQD: HCPCS | Performed by: INTERNAL MEDICINE

## 2022-02-17 PROCEDURE — 1090F PRES/ABSN URINE INCON ASSESS: CPT | Performed by: INTERNAL MEDICINE

## 2022-02-17 PROCEDURE — G8536 NO DOC ELDER MAL SCRN: HCPCS | Performed by: INTERNAL MEDICINE

## 2022-02-17 PROCEDURE — 3017F COLORECTAL CA SCREEN DOC REV: CPT | Performed by: INTERNAL MEDICINE

## 2022-02-17 PROCEDURE — G8428 CUR MEDS NOT DOCUMENT: HCPCS | Performed by: INTERNAL MEDICINE

## 2022-02-17 PROCEDURE — 99214 OFFICE O/P EST MOD 30 MIN: CPT | Performed by: INTERNAL MEDICINE

## 2022-02-17 NOTE — PROGRESS NOTES
Identified pt with two pt identifiers(name and ). Reviewed record in preparation for visit and have obtained necessary documentation. Marlen Perez presents today for   Chief Complaint   Patient presents with    Follow-up     cardiac tesing                Marlen Perez preferred language for health care discussion is english/other. Personal Protective Equipment:   Personal Protective Equipment was used including: mask-surgical and hands-gloves. Patient was placed on no precaution(s). Patient was masked. Precautions:   Patient currently on None  Patient currently roomed with door closed. Is someone accompanying this pt? no    Is the patient using any DME equipment during [address]? no    Depression Screening:  3 most recent PHQ Screens 2021   Little interest or pleasure in doing things Not at all   Feeling down, depressed, irritable, or hopeless Not at all   Total Score PHQ 2 0       Learning Assessment:  Learning Assessment 2019   PRIMARY LEARNER Patient   BARRIERS PRIMARY LEARNER [address] CAREGIVER No   PRIMARY LANGUAGE ENGLISH   LEARNER PREFERENCE PRIMARY DEMONSTRATION   ANSWERED BY Patient   RELATIONSHIP SELF       Abuse Screening:  Abuse Screening Questionnaire 2019   Do you ever feel afraid of your partner? N   Are you in a relationship with someone who physically or mentally threatens you? N   Is it safe for you to go home? Y       Fall Risk  Fall Risk Assessment, last 12 mths 10/26/2021   Able to walk? Yes   Fall in past 12 months? 0   Do you feel unsteady? 0   Are you worried about falling 0       Pt currently taking Anticoagulant therapy? no  Pt currently taking Antiplatelet therapy? no  Coordination of Care:  1. Have you been to the ER, urgent care clinic since your last visit? Hospitalized since your last visit? no    2. Have you seen or consulted any other health care providers outside of the [address] [place] [address] since your last visit?  Include any pap smears or colon screening. no      Please see Red banners under Allergies and Med Rec to remove outside inquires. All correct information has been verified with patient and added to chart.      Medication's patient's would liked removed has been marked not taking to be removed per Verbal order and read back per Theresa Mahmood MD

## 2022-02-17 NOTE — LETTER
2/17/2022    Patient: Royal Huang   YOB: 1945   Date of Visit: 2/17/2022     Christa Robb MD  [id number] [facility]  [address]  [place] [facility] [address]    Dear Christa Robb MD,      Thank you for referring Ms. Lisa Seaman to CARDIO SPECIALIST AT [facility] for evaluation. My notes for this consultation are attached. If you have questions, please do not hesitate to call me. I look forward to following your patient along with you.       Sincerely,    Tanisha Smith MD

## 2022-02-17 NOTE — PROGRESS NOTES
Cardiovascular Specialists    Ms. Maile Paz is 77 y.o. female with a history of glaucoma    Patient is here today for cardiac evaluation. She denies any prior history of MI or CHF. Patient was sent to me for the evaluation of abnormal EKG. Patient had echocardiogram done since last time. Patient took ibuprofen for her milligrams 23 times a day for 2 weeks for possible inflammatory pericardial effusion, asymptomatic  Denies any resting or exertional chest pain or chest pressure to suggest angina or any dyspnea to suggest heart failure. No presyncope or syncope  Denies any PND or LE edema. Taking all medications regularly. Denies any nausea, vomiting, abdominal pain, fever, chills, sputum production. No hematuria or other bleeding complaints    Past Medical History:   Diagnosis Date    Glaucoma     Menopause        Review of Systems:  Cardiac symptoms as noted above in HPI. All others negative. Denies fatigue, malaise, skin rash, joint pain, blurring vision, photophobia, neck pain, hemoptysis, chronic cough, nausea, vomiting, hematuria, burning micturition, BRBPR, chronic headaches. Current Outpatient Medications   Medication Sig    alendronate (FOSAMAX) 70 mg tablet TAKE 1 TABLET BY MOUTH ONCE WEEKLY ON AN EMPTY STOMACH BEFORE BREAKFAST. REMAIN UPRIGHT FOR 30 MINUTES & TAKE WITH 8 OUNCES OF WATER    bimatoprost (LUMIGAN) 0.01 % ophthalmic drops Administer 1 Drop to both eyes every evening.  calcium-cholecalciferol, D3, (CALTRATE 600+D) tablet Take 1 Tab by mouth two (2) times a day. No current facility-administered medications for this visit. No past surgical history on file.     Allergies and Sensitivities:  No Known Allergies    Family History:  Family History   Problem Relation Age of Onset    Breast Cancer Mother 48    Cancer Father         colon       Social History:  Social History     Tobacco Use    Smoking status: Never Smoker    Smokeless tobacco: Never Used   Substance Use Topics    Alcohol use: Yes    Drug use: No     She  reports that she has never smoked. She has never used smokeless tobacco.  She  reports current alcohol use. Physical Exam:  BP Readings from Last 3 Encounters:   02/17/22 128/62   01/04/22 137/73   12/07/21 133/67         Pulse Readings from Last 3 Encounters:   02/17/22 82   12/07/21 89   10/26/21 76          Wt Readings from Last 3 Encounters:   02/17/22 52.2 kg (115 lb)   01/04/22 52.2 kg (115 lb)   12/07/21 52.2 kg (115 lb)       Constitutional: Oriented to person, place, and time. HENT: Head: Normocephalic and atraumatic. Neck: No JVD present. Carotid bruit is not appreciated. Cardiovascular: Regular rhythm. No murmur, gallop or rubs appreciated  Lung: Breath sounds normal. No respiratory distress. No ronchi or rales appreciated  Abdominal: No tenderness. No rebound and no guarding. Musculoskeletal: There is no lower extremity edema. No cynosis    LABS:   @  Lab Results   Component Value Date/Time    WBC 6.6 10/26/2021 09:55 AM    HGB 14.8 10/26/2021 09:55 AM    HCT 45.5 (H) 10/26/2021 09:55 AM    PLATELET 298 87/59/6226 09:55 AM    .9 (H) 10/26/2021 09:55 AM     Lab Results   Component Value Date/Time    Sodium 139 05/04/2010 03:52 PM    Potassium 4.2 05/04/2010 03:52 PM    Chloride 102 05/04/2010 03:52 PM    CO2 29 05/04/2010 03:52 PM    Glucose 72 (L) 05/04/2010 03:52 PM    BUN 22 (H) 05/04/2010 03:52 PM    Creatinine 0.8 05/04/2010 03:52 PM     Lipids Latest Ref Rng & Units 10/26/2021 1/7/2020   Chol, Total <200 MG/ 200(H)   HDL 40 - 60 MG/DL 83(H) 80(H)   LDL 0 - 100 MG/DL 93.4 107. 8(H)   Trig <150 MG/DL 63 61   Chol/HDL Ratio 0 - 5.0   2.3 2.5   Some recent data might be hidden     Lab Results   Component Value Date/Time    ALT (SGPT) 36 05/04/2010 03:52 PM     No results found for: HBA1C, CKM5VRVQ, YNJ8SYUZ, YQF2VCEK  No results found for: TSH, TSH2, TSH3, TSHP, TSHEXT, TSHEXT    EK21: Sinus rhythm. Level branch block. Nonspecific ST-T changes. No ST changes of ischemia    22    ECHO ADULT COMPLETE 2022    Interpretation Summary    Left Ventricle: Left ventricle size is normal. Normal wall thickness. Normal wall motion. Low normal left ventricular systolic function with a visually estimated EF of 50 - 55%. Normal diastolic function.   Pericardium: Small (<1 cm) localized pericardial effusion present around the right ventricle. No indication of cardiac tamponade. CATHETERIZATION    IMPRESSION & PLAN:  [facility] 77 [facility]    Left bundle branch block: This is chronic in nature. Asymptomatic  Echo with normal EF. Continue with clinical observation    Pericardial effusion:  Echocardiogram in 2022 showed asymptomatic pericardial effusion. Patient took ibuprofen for her milligrams 3 times a day for 2 weeks. Currently asymptomatic. No evidence of fluid overload. No JVD. Heart sound appreciated appropriately  We will repeat limited echocardiogram to evaluate pericardial effusion    This plan was discussed with patient who is in agreement. Thank you for allowing me to participate in patient care. Please feel free to call me if you have any question or concern. Marquise Orozco MD  Please note: This document has been produced using voice recognition software. Unrecognized errors in transcription may be present.

## 2022-03-07 ENCOUNTER — TELEPHONE (OUTPATIENT)
Dept: CARDIOLOGY CLINIC | Age: 77
End: 2022-03-07

## 2022-03-18 PROBLEM — M81.0 AGE RELATED OSTEOPOROSIS: Status: ACTIVE | Noted: 2019-03-21

## 2022-03-19 PROBLEM — Z71.89 ADVANCE CARE PLANNING: Status: ACTIVE | Noted: 2019-03-21

## 2022-04-04 ENCOUNTER — FOLLOW UP (OUTPATIENT)
Dept: URBAN - METROPOLITAN AREA CLINIC 1 | Facility: CLINIC | Age: 77
End: 2022-04-04

## 2022-04-04 DIAGNOSIS — H04.123: ICD-10-CM

## 2022-04-04 DIAGNOSIS — H40.1131: ICD-10-CM

## 2022-04-04 DIAGNOSIS — H16.143: ICD-10-CM

## 2022-04-04 PROCEDURE — 92083 EXTENDED VISUAL FIELD XM: CPT

## 2022-04-04 PROCEDURE — 99213 OFFICE O/P EST LOW 20 MIN: CPT

## 2022-04-04 ASSESSMENT — VISUAL ACUITY
OS_CC: 20/20
OD_CC: 20/20

## 2022-04-04 ASSESSMENT — TONOMETRY
OD_IOP_MMHG: 15
OS_IOP_MMHG: 14

## 2022-04-04 NOTE — PATIENT DISCUSSION
(CD 0.60/0.70) HVF WNL OU. IOP stable. Continue Lumigan QHS OU. Patient advised to be compliant with gtts. Condition was discussed with patient and patient understands. Will continue to monitor patient for any progression in condition. Patient was advised to call us with any problems questions or concerns.

## 2022-04-08 ASSESSMENT — VISUAL ACUITY
OD_CC: J1
OD_SC: 20/40
OD_SC: 20/20
OS_GLARE: 20/150
OD_SC: 20/25
OS_SC: 20/20
OS_CC: J1
OD_SC: 20/25
OD_SC: 20/25
OD_SC: 20/20-1
OS_CC: J3
OS_SC: 20/25
OD_CC: J1
OS_SC: 20/20
OS_SC: 20/20-2
OS_SC: 20/30
OS_CC: J1
OD_SC: 20/25
OD_SC: 20/20
OD_CC: J1
OD_SC: 20/20-2
OD_GLARE: 20/150
OS_SC: 20/20
OD_CC: J5
OS_SC: 20/20
OS_SC: 20/20
OS_CC: J1
OD_SC: 20/20
OS_SC: 20/20

## 2022-04-08 ASSESSMENT — TONOMETRY
OS_IOP_MMHG: 14
OD_IOP_MMHG: 12
OD_IOP_MMHG: 16
OS_IOP_MMHG: 16
OD_IOP_MMHG: 15
OS_IOP_MMHG: 19
OS_IOP_MMHG: 13
OD_IOP_MMHG: 16
OD_IOP_MMHG: 14
OS_IOP_MMHG: 12
OS_IOP_MMHG: 17
OS_IOP_MMHG: 14
OS_IOP_MMHG: 14
OD_IOP_MMHG: 11
OD_IOP_MMHG: 13
OS_IOP_MMHG: 15
OS_IOP_MMHG: 16
OD_IOP_MMHG: 19
OD_IOP_MMHG: 13
OD_IOP_MMHG: 18

## 2022-04-08 ASSESSMENT — KERATOMETRY
OS_K1POWER_DIOPTERS: 43.50
OD_K2POWER_DIOPTERS: 43.25
OS_K2POWER_DIOPTERS: 43.25
OD_AXISANGLE_DEGREES: 108
OS_AXISANGLE2_DEGREES: 074
OD_K1POWER_DIOPTERS: 43.50
OD_AXISANGLE2_DEGREES: 018
OS_AXISANGLE_DEGREES: 164
OS_K2POWER_DIOPTERS: 43.50
OS_K1POWER_DIOPTERS: 44.00
OD_K2POWER_DIOPTERS: 43.25
OD_K1POWER_DIOPTERS: 42.75
OS_AXISANGLE2_DEGREES: 097
OD_AXISANGLE_DEGREES: 171
OS_AXISANGLE_DEGREES: 007
OD_AXISANGLE2_DEGREES: 081

## 2022-06-13 DIAGNOSIS — M81.0 AGE-RELATED OSTEOPOROSIS WITHOUT CURRENT PATHOLOGICAL FRACTURE: ICD-10-CM

## 2022-06-15 RX ORDER — ALENDRONATE SODIUM 70 MG/1
TABLET ORAL
Qty: 12 TABLET | Refills: 2 | Status: SHIPPED | OUTPATIENT
Start: 2022-06-15

## 2022-10-25 ENCOUNTER — COMPREHENSIVE EXAM (OUTPATIENT)
Dept: URBAN - METROPOLITAN AREA CLINIC 1 | Facility: CLINIC | Age: 77
End: 2022-10-25

## 2022-10-25 DIAGNOSIS — H40.1131: ICD-10-CM

## 2022-10-25 DIAGNOSIS — Z96.1: ICD-10-CM

## 2022-10-25 DIAGNOSIS — H04.123: ICD-10-CM

## 2022-10-25 DIAGNOSIS — H35.361: ICD-10-CM

## 2022-10-25 DIAGNOSIS — H43.811: ICD-10-CM

## 2022-10-25 DIAGNOSIS — H16.143: ICD-10-CM

## 2022-10-25 PROCEDURE — 99214 OFFICE O/P EST MOD 30 MIN: CPT

## 2022-10-25 PROCEDURE — 92133 CPTRZD OPH DX IMG PST SGM ON: CPT

## 2022-10-25 ASSESSMENT — TONOMETRY
OS_IOP_MMHG: 14
OD_IOP_MMHG: 14

## 2022-10-25 ASSESSMENT — VISUAL ACUITY
OS_CC: 20/20
OS_CC: J1+
OD_CC: 20/20

## 2022-10-25 NOTE — PATIENT DISCUSSION
(CD 0.70/0.75) No progression by OCT OU, stable. IOP stable. Continue Lumigan QHS OU. Patient advised to be compliant with gtts. Condition was discussed with patient and patient understands. Will continue to monitor patient for any progression in condition. Patient was advised to call us with any problems questions or concerns.

## 2022-12-06 ENCOUNTER — TRANSCRIBE ORDER (OUTPATIENT)
Dept: SCHEDULING | Age: 77
End: 2022-12-06

## 2022-12-06 DIAGNOSIS — Z12.31 VISIT FOR SCREENING MAMMOGRAM: Primary | ICD-10-CM

## 2022-12-27 ENCOUNTER — HOSPITAL ENCOUNTER (OUTPATIENT)
Dept: WOMENS IMAGING | Age: 77
Discharge: HOME | End: 2022-12-27
Attending: FAMILY MEDICINE
Payer: MEDICARE

## 2022-12-27 DIAGNOSIS — Z12.31 VISIT FOR SCREENING MAMMOGRAM: ICD-10-CM

## 2022-12-27 PROCEDURE — 77063 BREAST TOMOSYNTHESIS BI: CPT

## 2023-01-03 ENCOUNTER — OFFICE VISIT (OUTPATIENT)
Dept: CARDIOLOGY CLINIC | Age: 78
End: 2023-01-03
Payer: MEDICARE

## 2023-01-03 VITALS
WEIGHT: 117 LBS | HEART RATE: 66 BPM | DIASTOLIC BLOOD PRESSURE: 75 MMHG | BODY MASS INDEX: 22.11 KG/M2 | OXYGEN SATURATION: 100 % | SYSTOLIC BLOOD PRESSURE: 132 MMHG

## 2023-01-03 DIAGNOSIS — I44.7 LBBB (LEFT BUNDLE BRANCH BLOCK): ICD-10-CM

## 2023-01-03 DIAGNOSIS — R94.31 ABNORMAL EKG: Primary | ICD-10-CM

## 2023-01-03 PROCEDURE — 1090F PRES/ABSN URINE INCON ASSESS: CPT | Performed by: INTERNAL MEDICINE

## 2023-01-03 PROCEDURE — G8510 SCR DEP NEG, NO PLAN REQD: HCPCS | Performed by: INTERNAL MEDICINE

## 2023-01-03 PROCEDURE — G8536 NO DOC ELDER MAL SCRN: HCPCS | Performed by: INTERNAL MEDICINE

## 2023-01-03 PROCEDURE — 1101F PT FALLS ASSESS-DOCD LE1/YR: CPT | Performed by: INTERNAL MEDICINE

## 2023-01-03 PROCEDURE — G8420 CALC BMI NORM PARAMETERS: HCPCS | Performed by: INTERNAL MEDICINE

## 2023-01-03 PROCEDURE — 1123F ACP DISCUSS/DSCN MKR DOCD: CPT | Performed by: INTERNAL MEDICINE

## 2023-01-03 PROCEDURE — 99213 OFFICE O/P EST LOW 20 MIN: CPT | Performed by: INTERNAL MEDICINE

## 2023-01-03 PROCEDURE — G8428 CUR MEDS NOT DOCUMENT: HCPCS | Performed by: INTERNAL MEDICINE

## 2023-01-03 NOTE — PROGRESS NOTES
Identified pt with two pt identifiers(name and ). Reviewed record in preparation for visit and have obtained necessary documentation.  presents today for   Chief Complaint   Patient presents with    Follow-up       Pt denied DIZZINESS, SOB, CHEST PAIN/ PRESSURE, FATIGUE/WEAKNESS, HEADACHES, SWELLING.  preferred language for health care discussion is english/other. Personal Protective Equipment:   Personal Protective Equipment was used including: mask-surgical and hands-gloves. Patient was placed on no precaution(s). Patient was masked. Precautions:   Patient currently on None  Patient currently roomed with door closed. Is someone accompanying this pt? no    Is the patient using any DME equipment during [address]? no    Depression Screening:  3 most recent PHQ Screens 1/3/2023   Little interest or pleasure in doing things Not at all   Feeling down, depressed, irritable, or hopeless Not at all   Total Score PHQ 2 0       Learning Assessment:  Learning Assessment 2019   PRIMARY LEARNER Patient   BARRIERS PRIMARY LEARNER [address] CAREGIVER No   PRIMARY LANGUAGE ENGLISH   LEARNER PREFERENCE PRIMARY DEMONSTRATION   ANSWERED BY Patient   RELATIONSHIP SELF       Abuse Screening:  Abuse Screening Questionnaire 2019   Do you ever feel afraid of your partner? N   Are you in a relationship with someone who physically or mentally threatens you? N   Is it safe for you to go home? Y       Fall Risk  Fall Risk Assessment, last 12 mths 2022   Able to walk? Yes   Fall in past 12 months? -   Do you feel unsteady? -   Are you worried about falling -       Pt currently taking Anticoagulant therapy? no  Pt currently taking Antiplatelet therapy? no    Coordination of Care:  1. Have you been to the ER, urgent care clinic since your last visit? Hospitalized since your last visit? no    2.  Have you seen or consulted any other health care providers outside of the [facility] since your last visit? Include any pap smears or colon screening. no      Please see Red banners under Allergies and Med Rec to remove outside inquires. All correct information has been verified with patient and added to chart.      Medication's patient's would liked removed has been marked not taking to be removed per Verbal order and read back per Lizeth Villegas MD

## 2023-01-03 NOTE — LETTER
1/3/2023    Patient: Song Flores   YOB: 1945   Date of Visit: 1/3/2023     Edson Burns MD  [id number] [facility]  [address] [address]  [place] [place]    Dear Edson Burns MD,      Thank you for referring Ms. Lorrie Rachel to CARDIO SPECIALIST AT [facility] for evaluation. My notes for this consultation are attached. If you have questions, please do not hesitate to call me. I look forward to following your patient along with you.       Sincerely,    Shelli Ruelas MD

## 2023-01-03 NOTE — PROGRESS NOTES
[facility]    Ms. Ally Gonzalez is 69 y.o. female with a history of glaucoma    Patient is here today for cardiac evaluation. She denies any prior history of MI or CHF. Denies any resting or exertional chest pain or chest pressure to suggest angina or any dyspnea to suggest heart failure. No presyncope or syncope  Denies any PND or LE edema. Taking all medications regularly. Past Medical History:   Diagnosis Date    Glaucoma     Menopause        Review of Systems:  Cardiac symptoms as noted above in HPI. All others negative. Denies fatigue, malaise, skin rash, joint pain, blurring vision, photophobia, neck pain, hemoptysis, chronic cough, nausea, vomiting, hematuria, burning micturition, BRBPR, chronic headaches. Current Outpatient Medications   Medication Sig    alendronate (FOSAMAX) 70 mg tablet TAKE ONE TABLET BY MOUTH ONCE WEEKLY ON AN EMPTY STOMACH BEFORE BREAKFAST --- REMAIN UPRIGHT FOR 30 MINUTES AND TAKE WITH 8 OUNCES OF WATER    bimatoprost (LUMIGAN) 0.01 % ophthalmic drops Administer 1 Drop to both eyes every evening. calcium-cholecalciferol, D3, (CALTRATE 600+D) tablet Take 1 Tab by mouth two (2) times a day. No current facility-administered medications for this visit. No past surgical history on file. Allergies and Sensitivities:  No Known Allergies    Family History:  Family History   Problem Relation Age of Onset    Breast Cancer Mother 48    Cancer Father         colon       Social History:  Social History     Tobacco Use    Smoking status: Never    Smokeless tobacco: Never   Substance Use Topics    Alcohol use: Yes    Drug use: No     She  reports that she has never smoked. She has never used smokeless tobacco.  She  reports current alcohol use.     Physical Exam:  BP Readings from Last 3 Encounters:   01/03/23 132/75   03/08/22 126/72   02/17/22 128/62         Pulse Readings from Last 3 Encounters: 23 66   22 82   21 89          Wt Readings from Last 3 Encounters:   23 53.1 kg (117 lb)   22 52.2 kg (115 lb)   22 52.2 kg (115 lb)       Constitutional: Oriented to person, place, and time. HENT: Head: Normocephalic and atraumatic. Neck: No JVD present. Carotid bruit is not appreciated. Cardiovascular: Regular rhythm. No murmur, gallop or rubs appreciated  Lung: Breath sounds normal. No respiratory distress. No ronchi or rales appreciated  Abdominal: No tenderness. No rebound and no guarding. Musculoskeletal: There is no lower extremity edema. No cynosis    LABS:   @  Lab Results   Component Value Date/Time    WBC 6.6 10/26/2021 09:55 AM    HGB 14.8 10/26/2021 09:55 AM    HCT 45.5 (H) 10/26/2021 09:55 AM    PLATELET 533  09:55 AM    .9 (H) 10/26/2021 09:55 AM     Lab Results   Component Value Date/Time    Sodium 139 2010 03:52 PM    Potassium 4.2 2010 03:52 PM    Chloride 102 2010 03:52 PM    CO2 29 2010 03:52 PM    Glucose 72 (L) 2010 03:52 PM    BUN 22 (H) 2010 03:52 PM    Creatinine 0.8 2010 03:52 PM     Lipids Latest Ref Rng & Units 10/26/2021 2020   Chol, Total <200 MG/ 200(H)   HDL 40 - 60 MG/DL 83(H) 80(H)   LDL 0 - 100 MG/DL 93.4 107. 8(H)   Trig <150 MG/DL 63 61   Chol/HDL Ratio 0 - 5.0   2.3 2.5   Some recent data might be hidden     Lab Results   Component Value Date/Time    ALT (SGPT) 36 2010 03:52 PM     No results found for: HBA1C, SDV9WRGX, BTG4BSXH, FBJ5WSWV  No results found for: TSH, TSH2, TSH3, TSHP, TSHEXT, TSHEXT    EK21: Sinus rhythm. Level branch block. Nonspecific ST-T changes. No ST changes of ischemia    22    ECHO ADULT FOLLOW-UP OR LIMITED 2022 3/8/2022  Interpretation Summary    Left Ventricle: Normal left ventricular systolic function with a visually estimated EF of 55 - 60%.     Pericardium: Small (<1 cm) localized pericardial effusion present around the right ventricle and right atrium. No indication of cardiac tamponade. Evidence includes no IVC dilation, normal hepatic veins, no chamber collapse and no significant respiratory transvalvular variation. CATHETERIZATION    IMPRESSION & PLAN:  Ms. Vanessa Martines is 69 y.o. female    Left bundle branch block: This is chronic in nature. Echo with normal EF. Continue with clinical observation    Pericardial effusion:  Echocardiogram in 1/2022 showed asymptomatic pericardial effusion. Patient took ibuprofen for her milligrams 3 times a day for 2 weeks. Echo in 03/2022 with small localized pericardial effusion around right ventricle and right atrium. Asymptomatic. No tamponade  Patient is asymptomatic. No edema, no JVD, no shortness of breath. Heart sounds is well appreciated. Blood pressure is normal.    This plan was discussed with patient who is in agreement. Thank you for allowing me to participate in patient care. Please feel free to call me if you have any question or concern. Nataly Garcia MD  Please note: This document has been produced using voice recognition software. Unrecognized errors in transcription may be present.

## 2023-03-02 RX ORDER — ALENDRONATE SODIUM 70 MG/1
TABLET ORAL
Qty: 12 TABLET | Refills: 3 | Status: SHIPPED | OUTPATIENT
Start: 2023-03-02

## 2023-05-06 SDOH — HEALTH STABILITY: PHYSICAL HEALTH: ON AVERAGE, HOW MANY MINUTES DO YOU ENGAGE IN EXERCISE AT THIS LEVEL?: 60 MIN

## 2023-05-06 SDOH — HEALTH STABILITY: PHYSICAL HEALTH: ON AVERAGE, HOW MANY DAYS PER WEEK DO YOU ENGAGE IN MODERATE TO STRENUOUS EXERCISE (LIKE A BRISK WALK)?: 5 DAYS

## 2023-05-06 ASSESSMENT — PATIENT HEALTH QUESTIONNAIRE - PHQ9
2. FEELING DOWN, DEPRESSED OR HOPELESS: 0
SUM OF ALL RESPONSES TO PHQ QUESTIONS 1-9: 0
SUM OF ALL RESPONSES TO PHQ QUESTIONS 1-9: 0
1. LITTLE INTEREST OR PLEASURE IN DOING THINGS: 0
SUM OF ALL RESPONSES TO PHQ QUESTIONS 1-9: 0
SUM OF ALL RESPONSES TO PHQ9 QUESTIONS 1 & 2: 0
SUM OF ALL RESPONSES TO PHQ QUESTIONS 1-9: 0

## 2023-05-06 ASSESSMENT — LIFESTYLE VARIABLES
HOW OFTEN DO YOU HAVE SIX OR MORE DRINKS ON ONE OCCASION: 1
HOW MANY STANDARD DRINKS CONTAINING ALCOHOL DO YOU HAVE ON A TYPICAL DAY: 1 OR 2
HOW MANY STANDARD DRINKS CONTAINING ALCOHOL DO YOU HAVE ON A TYPICAL DAY: 1

## 2023-05-08 ENCOUNTER — OFFICE VISIT (OUTPATIENT)
Facility: CLINIC | Age: 78
End: 2023-05-08
Payer: MEDICARE

## 2023-05-08 ENCOUNTER — HOSPITAL ENCOUNTER (OUTPATIENT)
Facility: HOSPITAL | Age: 78
Setting detail: SPECIMEN
Discharge: HOME | End: 2023-05-11
Payer: MEDICARE

## 2023-05-08 VITALS
BODY MASS INDEX: 22.28 KG/M2 | RESPIRATION RATE: 16 BRPM | DIASTOLIC BLOOD PRESSURE: 85 MMHG | HEART RATE: 78 BPM | HEIGHT: 61 IN | TEMPERATURE: 97.2 F | SYSTOLIC BLOOD PRESSURE: 119 MMHG | OXYGEN SATURATION: 97 % | WEIGHT: 118 LBS

## 2023-05-08 DIAGNOSIS — R68.89 OTHER GENERAL SYMPTOMS AND SIGNS: ICD-10-CM

## 2023-05-08 DIAGNOSIS — I44.7 LEFT BUNDLE-BRANCH BLOCK, UNSPECIFIED: ICD-10-CM

## 2023-05-08 DIAGNOSIS — Z71.89 ADVANCE CARE PLANNING: ICD-10-CM

## 2023-05-08 DIAGNOSIS — M81.0 AGE-RELATED OSTEOPOROSIS WITHOUT CURRENT PATHOLOGICAL FRACTURE: ICD-10-CM

## 2023-05-08 DIAGNOSIS — H40.1131 PRIMARY OPEN ANGLE GLAUCOMA (POAG) OF BOTH EYES, MILD STAGE: ICD-10-CM

## 2023-05-08 DIAGNOSIS — E78.00 HYPERCHOLESTEROLEMIA: ICD-10-CM

## 2023-05-08 DIAGNOSIS — Z00.00 MEDICARE ANNUAL WELLNESS VISIT, SUBSEQUENT: Primary | ICD-10-CM

## 2023-05-08 LAB
CHOLEST SERPL-MCNC: 212 MG/DL
HDLC SERPL-MCNC: 89 MG/DL (ref 40–60)
HDLC SERPL: 2.4 (ref 0–5)
LDLC SERPL CALC-MCNC: 112 MG/DL (ref 0–100)
LIPID PANEL: ABNORMAL
TRIGL SERPL-MCNC: 55 MG/DL
TSH SERPL DL<=0.05 MIU/L-ACNC: 1.61 UIU/ML (ref 0.36–3.74)
VLDLC SERPL CALC-MCNC: 11 MG/DL

## 2023-05-08 PROCEDURE — G0439 PPPS, SUBSEQ VISIT: HCPCS | Performed by: FAMILY MEDICINE

## 2023-05-08 PROCEDURE — 84443 ASSAY THYROID STIM HORMONE: CPT

## 2023-05-08 PROCEDURE — 99497 ADVNCD CARE PLAN 30 MIN: CPT | Performed by: FAMILY MEDICINE

## 2023-05-08 PROCEDURE — 80061 LIPID PANEL: CPT

## 2023-05-08 PROCEDURE — 36415 COLL VENOUS BLD VENIPUNCTURE: CPT

## 2023-05-08 PROCEDURE — 99214 OFFICE O/P EST MOD 30 MIN: CPT | Performed by: FAMILY MEDICINE

## 2023-05-08 SDOH — ECONOMIC STABILITY: INCOME INSECURITY: HOW HARD IS IT FOR YOU TO PAY FOR THE VERY BASICS LIKE FOOD, HOUSING, MEDICAL CARE, AND HEATING?: NOT HARD AT ALL

## 2023-05-08 SDOH — ECONOMIC STABILITY: HOUSING INSECURITY
IN THE LAST 12 MONTHS, WAS THERE A TIME WHEN YOU DID NOT HAVE A STEADY PLACE TO SLEEP OR SLEPT IN A SHELTER (INCLUDING NOW)?: NO

## 2023-05-08 SDOH — ECONOMIC STABILITY: FOOD INSECURITY: WITHIN THE PAST 12 MONTHS, YOU WORRIED THAT YOUR FOOD WOULD RUN OUT BEFORE YOU GOT MONEY TO BUY MORE.: NEVER TRUE

## 2023-05-08 SDOH — ECONOMIC STABILITY: FOOD INSECURITY: WITHIN THE PAST 12 MONTHS, THE FOOD YOU BOUGHT JUST DIDN'T LAST AND YOU DIDN'T HAVE MONEY TO GET MORE.: NEVER TRUE

## 2023-05-08 ASSESSMENT — ANXIETY QUESTIONNAIRES
6. BECOMING EASILY ANNOYED OR IRRITABLE: 0
2. NOT BEING ABLE TO STOP OR CONTROL WORRYING: 0
3. WORRYING TOO MUCH ABOUT DIFFERENT THINGS: 0
GAD7 TOTAL SCORE: 0
7. FEELING AFRAID AS IF SOMETHING AWFUL MIGHT HAPPEN: 0
4. TROUBLE RELAXING: 0
IF YOU CHECKED OFF ANY PROBLEMS ON THIS QUESTIONNAIRE, HOW DIFFICULT HAVE THESE PROBLEMS MADE IT FOR YOU TO DO YOUR WORK, TAKE CARE OF THINGS AT HOME, OR GET ALONG WITH OTHER PEOPLE: NOT DIFFICULT AT ALL
1. FEELING NERVOUS, ANXIOUS, OR ON EDGE: 0
5. BEING SO RESTLESS THAT IT IS HARD TO SIT STILL: 0

## 2023-05-08 ASSESSMENT — LIFESTYLE VARIABLES
HAS A RELATIVE, FRIEND, DOCTOR, OR ANOTHER HEALTH PROFESSIONAL EXPRESSED CONCERN ABOUT YOUR DRINKING OR SUGGESTED YOU CUT DOWN: 0
HAVE YOU OR SOMEONE ELSE BEEN INJURED AS A RESULT OF YOUR DRINKING: 0
HOW OFTEN DURING THE LAST YEAR HAVE YOU BEEN UNABLE TO REMEMBER WHAT HAPPENED THE NIGHT BEFORE BECAUSE YOU HAD BEEN DRINKING: 0
HOW OFTEN DURING THE LAST YEAR HAVE YOU HAD A FEELING OF GUILT OR REMORSE AFTER DRINKING: 0
HOW OFTEN DO YOU HAVE A DRINK CONTAINING ALCOHOL: 4 OR MORE TIMES A WEEK
HOW MANY STANDARD DRINKS CONTAINING ALCOHOL DO YOU HAVE ON A TYPICAL DAY: 1 OR 2
HOW OFTEN DURING THE LAST YEAR HAVE YOU FAILED TO DO WHAT WAS NORMALLY EXPECTED FROM YOU BECAUSE OF DRINKING: 0
HOW OFTEN DURING THE LAST YEAR HAVE YOU FOUND THAT YOU WERE NOT ABLE TO STOP DRINKING ONCE YOU HAD STARTED: 0
HOW OFTEN DURING THE LAST YEAR HAVE YOU NEEDED AN ALCOHOLIC DRINK FIRST THING IN THE MORNING TO GET YOURSELF GOING AFTER A NIGHT OF HEAVY DRINKING: 0

## 2023-05-08 NOTE — ACP (ADVANCE CARE PLANNING)
Advance Care Planning     General Advance Care Planning (ACP) Conversation    Date of Conversation: 5/8/2023  Conducted with: Patient with Decision Making Capacity    Healthcare Decision Maker:    Primary Decision Maker: Navya Wheeler - Spouse - [phone]    Secondary Decision Maker: Brandee Smallwood - Child - [phone]    Supplemental (Other) Decision Maker: Martin Gabriel - Child - [phone]  Click here to complete Healthcare Decision Makers including selection of the Healthcare Decision Maker Relationship (ie \"Primary\"). Today we documented Decision Maker(s) consistent with Legal Next of Kin hierarchy. Content/Action Overview:   Has ACP document(s) on file - reflects the patient's care preferences  Reviewed DNR/DNI and patient elects Full Code (Attempt Resuscitation)  treatment goals, benefit/burden of treatment options, ventilation preferences, hospitalization preferences, and resuscitation preferences      Length of Voluntary ACP Conversation in minutes:  16 minutes    Tomeka Freeman MD

## 2023-05-08 NOTE — PATIENT INSTRUCTIONS
[place] on Aging online. You need 7294-7174 mg of calcium and 9206-3003 IU of vitamin D per day. It is possible to meet your calcium requirement with diet alone, but a vitamin D supplement is usually necessary to meet this goal.  When exposed to the sun, use a sunscreen that protects against both UVA and UVB radiation with an SPF of 30 or greater. Reapply every 2 to 3 hours or after sweating, drying off with a towel, or swimming. Always wear a seat belt when traveling in a car. Always wear a helmet when riding a bicycle or motorcycle.

## 2023-06-16 ENCOUNTER — FOLLOW UP (OUTPATIENT)
Dept: URBAN - METROPOLITAN AREA CLINIC 1 | Facility: CLINIC | Age: 78
End: 2023-06-16

## 2023-06-16 DIAGNOSIS — H40.1131: ICD-10-CM

## 2023-06-16 PROCEDURE — 92083 EXTENDED VISUAL FIELD XM: CPT

## 2023-06-16 PROCEDURE — 99213 OFFICE O/P EST LOW 20 MIN: CPT

## 2023-06-16 ASSESSMENT — TONOMETRY
OD_IOP_MMHG: 13
OS_IOP_MMHG: 13

## 2023-06-16 ASSESSMENT — VISUAL ACUITY
OD_CC: J1+
OD_PH: 20/25
OS_PH: 20/25
OD_CC: 20/30
OS_CC: J1+
OS_CC: 20/30

## 2023-07-06 ENCOUNTER — OFFICE VISIT (OUTPATIENT)
Age: 78
End: 2023-07-06

## 2023-07-06 VITALS
SYSTOLIC BLOOD PRESSURE: 139 MMHG | OXYGEN SATURATION: 100 % | DIASTOLIC BLOOD PRESSURE: 82 MMHG | HEART RATE: 63 BPM | BODY MASS INDEX: 22.11 KG/M2 | WEIGHT: 117 LBS

## 2023-07-06 DIAGNOSIS — I31.39 PERICARDIAL EFFUSION (NONINFLAMMATORY): Primary | ICD-10-CM

## 2023-07-06 NOTE — PROGRESS NOTES
[facility]    Iris Hurtado is 69 y.o. female with a history of glaucoma      Patient is here today for cardiac evaluation. She denies any prior history of MI or CHF. Denies any resting or exertional chest pain or chest pressure to suggest angina or any dyspnea to suggest heart failure. No presyncope or syncope   Denies any PND or LE edema. Taking all medications regularly. Past Medical History:   Diagnosis Date    Glaucoma     Menopause        Review of Systems:  Cardiac symptoms as noted above in HPI. All others negative. Denies fatigue, malaise, skin rash, joint pain, blurring vision, photophobia, neck pain, hemoptysis, chronic cough, nausea, vomiting, hematuria, burning micturition, BRBPR, chronic headaches. Current Outpatient Medications   Medication Sig    alendronate (FOSAMAX) 70 MG tablet TAKE 1 TABLET BY MOUTH ONCE WEEKLY ON AN EMPTY STOMACH BEFORE BREAKFAST. REMAIN UPRIGHT FOR 30 MINUTES AND TAKE WITH 8 OUNCES OF WATER    bimatoprost (LUMIGAN) 0.01 % SOLN ophthalmic drops Apply 1 drop to eye every evening    calcium carb-cholecalciferol 600-10 MG-MCG TABS per tab Take 1 tablet by mouth 2 times daily     No current facility-administered medications for this visit. No past surgical history on file. Allergies and Sensitivities:  No Known Allergies    Family History:  Family History   Problem Relation Age of Onset    Cancer Father         colon    Breast Cancer Mother 48       Social History:  Social History     Tobacco Use    Smoking status: Never    Smokeless tobacco: Never   Substance Use Topics    Alcohol use: Yes    Drug use: No     She  reports that she has never smoked. She has never used smokeless tobacco.  She  reports current alcohol use.     Physical Exam:  BP Readings from Last 3 Encounters:   05/08/23 119/85   01/03/23 132/75   03/08/22 126/72         Pulse Readings from Last 3 Encounters:   05/08/23 78

## 2024-01-02 ENCOUNTER — HOSPITAL ENCOUNTER (OUTPATIENT)
Dept: WOMENS IMAGING | Facility: HOSPITAL | Age: 79
Discharge: HOME | End: 2024-01-05
Attending: FAMILY MEDICINE
Payer: MEDICARE

## 2024-01-02 DIAGNOSIS — Z12.31 VISIT FOR SCREENING MAMMOGRAM: ICD-10-CM

## 2024-01-02 PROCEDURE — 77063 BREAST TOMOSYNTHESIS BI: CPT

## 2024-02-05 RX ORDER — ALENDRONATE SODIUM 70 MG/1
TABLET ORAL
Qty: 12 TABLET | Refills: 3 | OUTPATIENT
Start: 2024-02-05

## 2024-02-06 NOTE — TELEPHONE ENCOUNTER
Patient contacted the office to request a refill on the following prescriptions: alendronate (FOSAMAX) 70 MG tablet.    Please advise, thank you.

## 2024-02-09 RX ORDER — ALENDRONATE SODIUM 70 MG/1
TABLET ORAL
Qty: 12 TABLET | Refills: 3 | Status: SHIPPED | OUTPATIENT
Start: 2024-02-09

## 2024-02-15 RX ORDER — ALENDRONATE SODIUM 70 MG/1
TABLET ORAL
Qty: 12 TABLET | Refills: 3 | OUTPATIENT
Start: 2024-02-15

## 2024-03-01 ENCOUNTER — COMPREHENSIVE EXAM (OUTPATIENT)
Dept: URBAN - METROPOLITAN AREA CLINIC 1 | Facility: CLINIC | Age: 79
End: 2024-03-01

## 2024-03-01 DIAGNOSIS — H43.811: ICD-10-CM

## 2024-03-01 DIAGNOSIS — H40.1131: ICD-10-CM

## 2024-03-01 DIAGNOSIS — H04.123: ICD-10-CM

## 2024-03-01 PROCEDURE — 92133 CPTRZD OPH DX IMG PST SGM ON: CPT

## 2024-03-01 PROCEDURE — 99214 OFFICE O/P EST MOD 30 MIN: CPT

## 2024-03-01 ASSESSMENT — TONOMETRY
OD_IOP_MMHG: 13
OS_IOP_MMHG: 13

## 2024-03-01 ASSESSMENT — VISUAL ACUITY
OD_CC: 20/20
OD_CC: J1
OS_CC: 20/20
OS_CC: J1

## 2024-07-11 ENCOUNTER — OFFICE VISIT (OUTPATIENT)
Age: 79
End: 2024-07-11

## 2024-07-11 VITALS
SYSTOLIC BLOOD PRESSURE: 136 MMHG | OXYGEN SATURATION: 98 % | DIASTOLIC BLOOD PRESSURE: 80 MMHG | HEIGHT: 61 IN | BODY MASS INDEX: 22.09 KG/M2 | HEART RATE: 72 BPM | WEIGHT: 117 LBS

## 2024-07-11 DIAGNOSIS — R94.31 ABNORMAL ELECTROCARDIOGRAM (ECG) (EKG): Primary | ICD-10-CM

## 2024-07-11 ASSESSMENT — PATIENT HEALTH QUESTIONNAIRE - PHQ9
SUM OF ALL RESPONSES TO PHQ QUESTIONS 1-9: 0
2. FEELING DOWN, DEPRESSED OR HOPELESS: NOT AT ALL
1. LITTLE INTEREST OR PLEASURE IN DOING THINGS: NOT AT ALL
SUM OF ALL RESPONSES TO PHQ QUESTIONS 1-9: 0
SUM OF ALL RESPONSES TO PHQ9 QUESTIONS 1 & 2: 0

## 2024-07-11 NOTE — PROGRESS NOTES
outside inquires. All correct information has been verified with patient and added to chart.     Medication's patient's would liked removed has been marked not taking to be removed per Verbal order and read back per Darien Leyva MD   
variation.      CATHETERIZATION      IMPRESSION & PLAN:   Ms. Pereyra is 78 y.o. female      Left bundle branch block:   This is chronic in nature.     Echo with normal EF in 03/2022.        Pericardial effusion:   Echocardiogram in 1/2022 showed asymptomatic pericardial effusion.  Patient took ibuprofen for 2 weeks at that time.   Echo in 03/2022 with small localized pericardial effusion around right ventricle and right atrium.     Patient is asymptomatic.  No edema, no JVD, no shortness of breath.  Heart sounds is well appreciated during exam.   Continue with clinical observation    Patient currently has no symptoms to report.  Different cardiac symptoms discussed with the patient.    This plan was discussed with patient who is in agreement.    Thank you for allowing me to participate in patient care. Please feel free to call me if you have any question or concern.     Darien Leyva MD  Please note: This document has been produced using voice recognition software. Unrecognized errors in transcription may be present.

## 2024-08-29 NOTE — PATIENT DISCUSSION
1.  Mild Open Angle Glaucoma OU (CD 0.60/0.65) - HVF shows non specific defect OU. IOP elevated despite compliance with Latanoprost. Hold Latanoprost Begin Lumigan QHS OU (sample given written rx given). Patient advised to be compliant with gtts. 2.  Dry Eyes OU - Cont ATs TID OU Routinely. 3.  Pseudophakia OU w/ LenSx OU H/o YAG Cap OU  4. H/o Macular Drusen OD  5. H/o PVD ODReturn for an appointment in 4 months 30/OCT with Dr. Lety Bradley. Pt's  Samantha, he is on HIPAA, called in regarding pt incision.    States the incision has yellowish dark discharge that happened yesterday. But per Dr. Hussein's aftercare follow states that the dressing is not to be changed so the staff @ [facility] will not current touch it      Please call Nick to advise what he should do @ [phone]

## 2024-10-11 ENCOUNTER — TELEPHONE (OUTPATIENT)
Facility: CLINIC | Age: 79
End: 2024-10-11

## 2025-01-03 ENCOUNTER — HOSPITAL ENCOUNTER (OUTPATIENT)
Dept: WOMENS IMAGING | Facility: HOSPITAL | Age: 80
Discharge: HOME | End: 2025-01-03
Payer: MEDICARE

## 2025-01-03 DIAGNOSIS — Z12.31 VISIT FOR SCREENING MAMMOGRAM: ICD-10-CM

## 2025-01-03 PROCEDURE — 77063 BREAST TOMOSYNTHESIS BI: CPT

## 2025-01-06 RX ORDER — ALENDRONATE SODIUM 70 MG/1
TABLET ORAL
Qty: 12 TABLET | Refills: 3 | OUTPATIENT
Start: 2025-01-06

## 2025-01-10 NOTE — TELEPHONE ENCOUNTER
Medication requested :   Requested Prescriptions     Pending Prescriptions Disp Refills    alendronate (FOSAMAX) 70 MG tablet [Pharmacy Med Name: ALENDRONATE SODIUM 70 MG TAB] 12 tablet 3     Sig: TAKE 1 TABLET BY MOUTH ONCE WEEKLY ON AN EMPTY STOMACH BEFORE BREAKFAST. REMAIN UPRIGHT FOR 30 MINUTES AND TAKE WITH 8 OUNCES OF WATER      PCP: Nate Conner MD  LOV:           5/8/2023   NOV DMA: 1/20/2025  FUTURE APPT:   Future Appointments   Date Time Provider Department Center   1/20/2025  2:15 PM Nate Conner MD [facility]   1/5/2026 10:20 AM FANTA SINGH [facility]       Thank you.

## 2025-01-13 RX ORDER — ALENDRONATE SODIUM 70 MG/1
TABLET ORAL
Qty: 12 TABLET | Refills: 3 | Status: SHIPPED | OUTPATIENT
Start: 2025-01-13

## 2025-01-17 SDOH — ECONOMIC STABILITY: FOOD INSECURITY: WITHIN THE PAST 12 MONTHS, THE FOOD YOU BOUGHT JUST DIDN'T LAST AND YOU DIDN'T HAVE MONEY TO GET MORE.: NEVER TRUE

## 2025-01-17 SDOH — ECONOMIC STABILITY: INCOME INSECURITY: IN THE LAST 12 MONTHS, WAS THERE A TIME WHEN YOU WERE NOT ABLE TO PAY THE MORTGAGE OR RENT ON TIME?: NO

## 2025-01-17 SDOH — ECONOMIC STABILITY: FOOD INSECURITY: WITHIN THE PAST 12 MONTHS, YOU WORRIED THAT YOUR FOOD WOULD RUN OUT BEFORE YOU GOT MONEY TO BUY MORE.: NEVER TRUE

## 2025-01-17 SDOH — ECONOMIC STABILITY: TRANSPORTATION INSECURITY
IN THE PAST 12 MONTHS, HAS THE LACK OF TRANSPORTATION KEPT YOU FROM MEDICAL APPOINTMENTS OR FROM GETTING MEDICATIONS?: NO

## 2025-01-17 SDOH — ECONOMIC STABILITY: TRANSPORTATION INSECURITY
IN THE PAST 12 MONTHS, HAS LACK OF TRANSPORTATION KEPT YOU FROM MEETINGS, WORK, OR FROM GETTING THINGS NEEDED FOR DAILY LIVING?: NO

## 2025-01-17 SDOH — HEALTH STABILITY: PHYSICAL HEALTH: ON AVERAGE, HOW MANY DAYS PER WEEK DO YOU ENGAGE IN MODERATE TO STRENUOUS EXERCISE (LIKE A BRISK WALK)?: 5 DAYS

## 2025-01-17 ASSESSMENT — PATIENT HEALTH QUESTIONNAIRE - PHQ9
1. LITTLE INTEREST OR PLEASURE IN DOING THINGS: NOT AT ALL
SUM OF ALL RESPONSES TO PHQ9 QUESTIONS 1 & 2: 0
2. FEELING DOWN, DEPRESSED OR HOPELESS: NOT AT ALL
SUM OF ALL RESPONSES TO PHQ QUESTIONS 1-9: 0

## 2025-01-17 ASSESSMENT — LIFESTYLE VARIABLES
HAVE YOU OR SOMEONE ELSE BEEN INJURED AS A RESULT OF YOUR DRINKING: NO
HOW OFTEN DURING THE LAST YEAR HAVE YOU NEEDED AN ALCOHOLIC DRINK FIRST THING IN THE MORNING TO GET YOURSELF GOING AFTER A NIGHT OF HEAVY DRINKING: NEVER
HOW OFTEN DO YOU HAVE A DRINK CONTAINING ALCOHOL: 5
HAVE YOU OR SOMEONE ELSE BEEN INJURED AS A RESULT OF YOUR DRINKING: NO
HOW OFTEN DURING THE LAST YEAR HAVE YOU FAILED TO DO WHAT WAS NORMALLY EXPECTED FROM YOU BECAUSE OF DRINKING: NEVER
HOW OFTEN DURING THE LAST YEAR HAVE YOU BEEN UNABLE TO REMEMBER WHAT HAPPENED THE NIGHT BEFORE BECAUSE YOU HAD BEEN DRINKING: NEVER
HOW MANY STANDARD DRINKS CONTAINING ALCOHOL DO YOU HAVE ON A TYPICAL DAY: 1
HOW OFTEN DURING THE LAST YEAR HAVE YOU FOUND THAT YOU WERE NOT ABLE TO STOP DRINKING ONCE YOU HAD STARTED: NEVER
HOW OFTEN DURING THE LAST YEAR HAVE YOU HAD A FEELING OF GUILT OR REMORSE AFTER DRINKING: NEVER
HOW OFTEN DO YOU HAVE A DRINK CONTAINING ALCOHOL: 4 OR MORE TIMES A WEEK
HOW OFTEN DURING THE LAST YEAR HAVE YOU HAD A FEELING OF GUILT OR REMORSE AFTER DRINKING: NEVER
HOW OFTEN DO YOU HAVE SIX OR MORE DRINKS ON ONE OCCASION: 1
HAS A RELATIVE, FRIEND, DOCTOR, OR ANOTHER HEALTH PROFESSIONAL EXPRESSED CONCERN ABOUT YOUR DRINKING OR SUGGESTED YOU CUT DOWN: NO
HOW MANY STANDARD DRINKS CONTAINING ALCOHOL DO YOU HAVE ON A TYPICAL DAY: 1 OR 2
HAS A RELATIVE, FRIEND, DOCTOR, OR ANOTHER HEALTH PROFESSIONAL EXPRESSED CONCERN ABOUT YOUR DRINKING OR SUGGESTED YOU CUT DOWN: NO
HOW OFTEN DURING THE LAST YEAR HAVE YOU BEEN UNABLE TO REMEMBER WHAT HAPPENED THE NIGHT BEFORE BECAUSE YOU HAD BEEN DRINKING: NEVER
HOW OFTEN DURING THE LAST YEAR HAVE YOU FAILED TO DO WHAT WAS NORMALLY EXPECTED FROM YOU BECAUSE OF DRINKING: NEVER
HOW OFTEN DURING THE LAST YEAR HAVE YOU FOUND THAT YOU WERE NOT ABLE TO STOP DRINKING ONCE YOU HAD STARTED: NEVER
HOW OFTEN DURING THE LAST YEAR HAVE YOU NEEDED AN ALCOHOLIC DRINK FIRST THING IN THE MORNING TO GET YOURSELF GOING AFTER A NIGHT OF HEAVY DRINKING: NEVER

## 2025-01-20 ENCOUNTER — OFFICE VISIT (OUTPATIENT)
Facility: CLINIC | Age: 80
End: 2025-01-20

## 2025-01-20 ENCOUNTER — HOSPITAL ENCOUNTER (OUTPATIENT)
Facility: HOSPITAL | Age: 80
Setting detail: SPECIMEN
Discharge: HOME | End: 2025-01-23
Payer: MEDICARE

## 2025-01-20 VITALS
OXYGEN SATURATION: 98 % | TEMPERATURE: 97.8 F | RESPIRATION RATE: 17 BRPM | HEIGHT: 61 IN | WEIGHT: 117 LBS | BODY MASS INDEX: 22.09 KG/M2 | SYSTOLIC BLOOD PRESSURE: 127 MMHG | DIASTOLIC BLOOD PRESSURE: 83 MMHG | HEART RATE: 79 BPM

## 2025-01-20 DIAGNOSIS — M81.0 AGE-RELATED OSTEOPOROSIS WITHOUT CURRENT PATHOLOGICAL FRACTURE: ICD-10-CM

## 2025-01-20 DIAGNOSIS — E78.00 HYPERCHOLESTEROLEMIA: ICD-10-CM

## 2025-01-20 DIAGNOSIS — I44.7 LEFT BUNDLE-BRANCH BLOCK, UNSPECIFIED: ICD-10-CM

## 2025-01-20 DIAGNOSIS — Z00.00 MEDICARE ANNUAL WELLNESS VISIT, SUBSEQUENT: Primary | ICD-10-CM

## 2025-01-20 DIAGNOSIS — H40.1131 PRIMARY OPEN ANGLE GLAUCOMA (POAG) OF BOTH EYES, MILD STAGE: ICD-10-CM

## 2025-01-20 LAB
ALBUMIN SERPL-MCNC: 3.9 G/DL (ref 3.4–5)
ALBUMIN/GLOB SERPL: 1.1 (ref 0.8–1.7)
ALP SERPL-CCNC: 60 U/L (ref 45–117)
ALT SERPL-CCNC: 23 U/L (ref 13–56)
ANION GAP SERPL CALC-SCNC: 3 MMOL/L (ref 3–18)
AST SERPL-CCNC: 20 U/L (ref 10–38)
BILIRUB SERPL-MCNC: 0.4 MG/DL (ref 0.2–1)
BUN SERPL-MCNC: 31 MG/DL (ref 7–18)
BUN/CREAT SERPL: 43 (ref 12–20)
CALCIUM SERPL-MCNC: 9.6 MG/DL (ref 8.5–10.1)
CHLORIDE SERPL-SCNC: 106 MMOL/L (ref 100–111)
CHOLEST SERPL-MCNC: 193 MG/DL
CO2 SERPL-SCNC: 29 MMOL/L (ref 21–32)
CREAT SERPL-MCNC: 0.72 MG/DL (ref 0.6–1.3)
GLOBULIN SER CALC-MCNC: 3.4 G/DL (ref 2–4)
GLUCOSE SERPL-MCNC: 102 MG/DL (ref 74–99)
HDLC SERPL-MCNC: 72 MG/DL (ref 40–60)
HDLC SERPL: 2.7 (ref 0–5)
LDLC SERPL CALC-MCNC: 102.8 MG/DL (ref 0–100)
LIPID PANEL: ABNORMAL
POTASSIUM SERPL-SCNC: 4.4 MMOL/L (ref 3.5–5.5)
PROT SERPL-MCNC: 7.3 G/DL (ref 6.4–8.2)
SODIUM SERPL-SCNC: 138 MMOL/L (ref 136–145)
TRIGL SERPL-MCNC: 91 MG/DL
VLDLC SERPL CALC-MCNC: 18.2 MG/DL

## 2025-01-20 PROCEDURE — 80061 LIPID PANEL: CPT

## 2025-01-20 PROCEDURE — 80053 COMPREHEN METABOLIC PANEL: CPT

## 2025-01-20 PROCEDURE — 36415 COLL VENOUS BLD VENIPUNCTURE: CPT

## 2025-01-20 ASSESSMENT — ANXIETY QUESTIONNAIRES
4. TROUBLE RELAXING: NOT AT ALL
1. FEELING NERVOUS, ANXIOUS, OR ON EDGE: NOT AT ALL
5. BEING SO RESTLESS THAT IT IS HARD TO SIT STILL: NOT AT ALL
IF YOU CHECKED OFF ANY PROBLEMS ON THIS QUESTIONNAIRE, HOW DIFFICULT HAVE THESE PROBLEMS MADE IT FOR YOU TO DO YOUR WORK, TAKE CARE OF THINGS AT HOME, OR GET ALONG WITH OTHER PEOPLE: NOT DIFFICULT AT ALL
3. WORRYING TOO MUCH ABOUT DIFFERENT THINGS: NOT AT ALL
2. NOT BEING ABLE TO STOP OR CONTROL WORRYING: NOT AT ALL
GAD7 TOTAL SCORE: 0
7. FEELING AFRAID AS IF SOMETHING AWFUL MIGHT HAPPEN: NOT AT ALL
6. BECOMING EASILY ANNOYED OR IRRITABLE: NOT AT ALL

## 2025-01-20 ASSESSMENT — VISUAL ACUITY
OS_CC: 20/20
OD_CC: 20/25

## 2025-01-20 NOTE — PROGRESS NOTES
Jeanette Pereyra is a 79 y.o.  female and presents with    Chief Complaint   Patient presents with    Medicare AWV       Subjective:  Well Adult Physical   Patient here for a comprehensive physical exam.The patient reports problems - left bundle branch block followed by dr. Leyva with no new complaints.  Recent upper respiratory infection now improved  Do you take any herbs or supplements that were not prescribed by a doctor? yes Are you taking calcium supplements? yes Are you taking aspirin daily? no     ROS   General ROS: negative for - chills, fatigue or fever  Psychological ROS: negative for - anxiety or depression  Ophthalmic ROS: positive for - uses glasses and using lumigan drops for glaucoma  ENT ROS: negative for - headaches, nasal congestion, sore throat: improved tinnitus; + post nasal drip  Endocrine ROS: negative for - polydipsia/polyuria or temperature intolerance  Respiratory ROS: no cough, shortness of breath, or wheezing  Cardiovascular ROS: no chest pain or dyspnea on exertion  Gastrointestinal ROS: no abdominal pain, change in bowel habits, or black or bloody stools  Genito-Urinary ROS: no dysuria, trouble voiding, or hematuria  Musculoskeletal ROS: positive for - joint pain; lump on right wrist  Neurological ROS: negative for - numbness/tingling or weakness; gradually worsening of balance  Dermatological ROS: positive for - eczema and 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    Objective:    /83 (Site: Left Upper Arm, Position: Sitting, Cuff Size: Small Adult)   Pulse 79   Temp 97.8 °F (36.6 °C) (Temporal)   Resp 17   Ht 1.549 m (5' 1\")   Wt 53.1 kg (117 lb)   SpO2 98%   BMI 22.11 kg/m²     General Appearance:  Alert, cooperative, no distress, appears stated age   Head:  Normocephalic, without obvious abnormality, atraumatic   Eyes:  PERRL, conjunctiva/corneas clear, EOM's

## 2025-01-20 NOTE — PROGRESS NOTES
Jeanette Pereyra is a 79 y.o. year old female who presents today for   Chief Complaint   Patient presents with    Medicare AWV        \"Have you been to the ER, urgent care clinic since your last visit?  Hospitalized since your last visit?\"   NO     “Have you seen or consulted any other health care providers outside our system since your last visit?”   NO             Cierra Bower [facility]  [address] [place] [address] [id number]  Ph: [phone]  Direct Fax: [phone]

## 2025-07-31 ENCOUNTER — COMPREHENSIVE EXAM (OUTPATIENT)
Age: 80
End: 2025-07-31

## 2025-07-31 DIAGNOSIS — H16.143: ICD-10-CM

## 2025-07-31 DIAGNOSIS — H40.1131: ICD-10-CM

## 2025-07-31 DIAGNOSIS — H35.361: ICD-10-CM

## 2025-07-31 DIAGNOSIS — Z96.1: ICD-10-CM

## 2025-07-31 DIAGNOSIS — H43.811: ICD-10-CM

## 2025-07-31 DIAGNOSIS — H04.123: ICD-10-CM

## 2025-07-31 PROCEDURE — 92133 CPTRZD OPH DX IMG PST SGM ON: CPT

## 2025-07-31 PROCEDURE — 99214 OFFICE O/P EST MOD 30 MIN: CPT

## 2025-07-31 PROCEDURE — 92015 DETERMINE REFRACTIVE STATE: CPT
